# Patient Record
Sex: MALE | Race: OTHER | HISPANIC OR LATINO
[De-identification: names, ages, dates, MRNs, and addresses within clinical notes are randomized per-mention and may not be internally consistent; named-entity substitution may affect disease eponyms.]

---

## 2017-10-17 PROBLEM — Z00.00 ENCOUNTER FOR PREVENTIVE HEALTH EXAMINATION: Status: ACTIVE | Noted: 2017-10-17

## 2017-10-20 ENCOUNTER — APPOINTMENT (OUTPATIENT)
Dept: GASTROENTEROLOGY | Facility: CLINIC | Age: 52
End: 2017-10-20

## 2017-10-20 ENCOUNTER — OUTPATIENT (OUTPATIENT)
Dept: OUTPATIENT SERVICES | Facility: HOSPITAL | Age: 52
LOS: 1 days | Discharge: HOME | End: 2017-10-20

## 2017-10-20 VITALS
BODY MASS INDEX: 26.5 KG/M2 | HEIGHT: 60 IN | HEART RATE: 58 BPM | SYSTOLIC BLOOD PRESSURE: 113 MMHG | WEIGHT: 135 LBS | DIASTOLIC BLOOD PRESSURE: 66 MMHG

## 2017-10-20 DIAGNOSIS — R62.50 UNSPECIFIED LACK OF EXPECTED NORMAL PHYSIOLOGICAL DEVELOPMENT IN CHILDHOOD: ICD-10-CM

## 2017-10-20 DIAGNOSIS — E78.5 HYPERLIPIDEMIA, UNSPECIFIED: ICD-10-CM

## 2017-10-20 RX ORDER — POLYETHYLENE GLYCOL 3350 AND ELECTROLYTES WITH LEMON FLAVOR 236; 22.74; 6.74; 5.86; 2.97 G/4L; G/4L; G/4L; G/4L; G/4L
236 POWDER, FOR SOLUTION ORAL
Qty: 1 | Refills: 0 | Status: ACTIVE | COMMUNITY
Start: 2017-10-20 | End: 1900-01-01

## 2018-04-18 ENCOUNTER — RESULT REVIEW (OUTPATIENT)
Age: 53
End: 2018-04-18

## 2018-04-18 ENCOUNTER — OUTPATIENT (OUTPATIENT)
Dept: OUTPATIENT SERVICES | Facility: HOSPITAL | Age: 53
LOS: 1 days | Discharge: HOME | End: 2018-04-18

## 2018-04-18 VITALS
RESPIRATION RATE: 16 BRPM | OXYGEN SATURATION: 100 % | DIASTOLIC BLOOD PRESSURE: 64 MMHG | SYSTOLIC BLOOD PRESSURE: 105 MMHG | HEART RATE: 77 BPM

## 2018-04-18 VITALS
HEART RATE: 95 BPM | TEMPERATURE: 98 F | DIASTOLIC BLOOD PRESSURE: 74 MMHG | SYSTOLIC BLOOD PRESSURE: 120 MMHG | WEIGHT: 134.92 LBS | HEIGHT: 60 IN | RESPIRATION RATE: 20 BRPM

## 2018-04-18 RX ORDER — CHOLECALCIFEROL (VITAMIN D3) 125 MCG
1 CAPSULE ORAL
Qty: 0 | Refills: 0 | COMMUNITY

## 2018-04-18 RX ORDER — SIMVASTATIN 20 MG/1
1 TABLET, FILM COATED ORAL
Qty: 0 | Refills: 0 | COMMUNITY

## 2018-04-18 NOTE — H&P ADULT - HISTORY OF PRESENT ILLNESS
52 year old male with history of hyperlipidemia and MR here for screening colonoscopy he had clearance by cardio and medical for borderline LVH.

## 2018-04-18 NOTE — H&P ADULT - NSHPPHYSICALEXAM_GEN_ALL_CORE
GENERAL:  Appears stated age, well-groomed, well-nourished, no distress  HEENT:  NC/AT,  conjunctivae clear and pink,  CHEST:  Full & symmetric excursion, no increased effort, breath sounds clear  HEART:  Regular rhythm, S1, S2,   ABDOMEN:  Soft, non-tender, non-distended,   EXTREMITIES:  no cyanosis clubbing or edema    NEURO:  Alert, oriented, no asterixis, no tremor, no encephalopathy

## 2018-04-21 DIAGNOSIS — K64.4 RESIDUAL HEMORRHOIDAL SKIN TAGS: ICD-10-CM

## 2018-04-21 DIAGNOSIS — Z12.11 ENCOUNTER FOR SCREENING FOR MALIGNANT NEOPLASM OF COLON: ICD-10-CM

## 2018-04-21 DIAGNOSIS — E78.5 HYPERLIPIDEMIA, UNSPECIFIED: ICD-10-CM

## 2018-04-21 DIAGNOSIS — I34.0 NONRHEUMATIC MITRAL (VALVE) INSUFFICIENCY: ICD-10-CM

## 2018-05-01 LAB — SURGICAL PATHOLOGY STUDY: SIGNIFICANT CHANGE UP

## 2022-12-27 ENCOUNTER — NON-APPOINTMENT (OUTPATIENT)
Age: 57
End: 2022-12-27

## 2023-01-04 ENCOUNTER — NON-APPOINTMENT (OUTPATIENT)
Age: 58
End: 2023-01-04

## 2023-06-27 PROBLEM — E78.5 HYPERLIPIDEMIA, UNSPECIFIED: Chronic | Status: ACTIVE | Noted: 2018-04-18

## 2023-06-27 PROBLEM — R62.50 UNSPECIFIED LACK OF EXPECTED NORMAL PHYSIOLOGICAL DEVELOPMENT IN CHILDHOOD: Chronic | Status: ACTIVE | Noted: 2018-04-18

## 2023-06-27 PROBLEM — I34.0 NONRHEUMATIC MITRAL (VALVE) INSUFFICIENCY: Chronic | Status: ACTIVE | Noted: 2018-04-18

## 2023-09-20 NOTE — ASU PREOP CHECKLIST - TO WHOM
HPI    OD Hordeolum lower lash line   Pt reports it started a few months ago   Pt reports pain/ itchy/   Pt reports using warm compresses   Pt reports using gtt prescribed   Pt reports was soft now its hard   Dr in April put in a referral for Opthalmology in March   Last edited by Rachael Bullock on 9/20/2023  3:07 PM.            Assessment /Plan     For exam results, see Encounter Report.    Moll's gland cyst of right eye  -referral Oculoplastic    Hordeolum externum right lower eyelid  -     Ambulatory referral/consult to Ophthalmology  -not a hordeolum no benefits to Abx, compresses, or drops    RTC as directed                     ENDO RN

## 2023-10-10 ENCOUNTER — APPOINTMENT (OUTPATIENT)
Dept: OPHTHALMOLOGY | Facility: CLINIC | Age: 58
End: 2023-10-10

## 2023-10-25 ENCOUNTER — OUTPATIENT (OUTPATIENT)
Dept: OUTPATIENT SERVICES | Facility: HOSPITAL | Age: 58
LOS: 1 days | End: 2023-10-25
Payer: SELF-PAY

## 2023-10-25 ENCOUNTER — APPOINTMENT (OUTPATIENT)
Dept: GASTROENTEROLOGY | Facility: CLINIC | Age: 58
End: 2023-10-25
Payer: COMMERCIAL

## 2023-10-25 VITALS
SYSTOLIC BLOOD PRESSURE: 128 MMHG | HEART RATE: 71 BPM | HEIGHT: 60 IN | BODY MASS INDEX: 29.25 KG/M2 | DIASTOLIC BLOOD PRESSURE: 86 MMHG | OXYGEN SATURATION: 97 % | WEIGHT: 149 LBS

## 2023-10-25 DIAGNOSIS — Z12.11 ENCOUNTER FOR SCREENING FOR MALIGNANT NEOPLASM OF COLON: ICD-10-CM

## 2023-10-25 DIAGNOSIS — Z00.00 ENCOUNTER FOR GENERAL ADULT MEDICAL EXAMINATION WITHOUT ABNORMAL FINDINGS: ICD-10-CM

## 2023-10-25 PROCEDURE — 99213 OFFICE O/P EST LOW 20 MIN: CPT

## 2023-10-25 PROCEDURE — 99214 OFFICE O/P EST MOD 30 MIN: CPT

## 2023-10-25 RX ORDER — POLYETHYLENE GLYCOL 3350 AND ELECTROLYTES WITH LEMON FLAVOR 236; 22.74; 6.74; 5.86; 2.97 G/4L; G/4L; G/4L; G/4L; G/4L
236 POWDER, FOR SOLUTION ORAL
Qty: 1 | Refills: 1 | Status: ACTIVE | COMMUNITY
Start: 2023-10-25 | End: 1900-01-01

## 2023-11-16 NOTE — ASU PATIENT PROFILE, ADULT - PMH
Other PATIENT CALLED TO R\EQUEST AN RX FOR A HANDICAP PLAQCARD, SHE SAID ITS HARD TO WALK FOR A DISTANCE.  PLS CALL TO ADVISE [phone] Hyperlipemia    Mitral regurgitation    Moderate developmental delay

## 2024-01-09 ENCOUNTER — APPOINTMENT (OUTPATIENT)
Dept: OPHTHALMOLOGY | Facility: CLINIC | Age: 59
End: 2024-01-09

## 2024-05-01 ENCOUNTER — APPOINTMENT (OUTPATIENT)
Dept: GASTROENTEROLOGY | Facility: CLINIC | Age: 59
End: 2024-05-01

## 2024-06-18 ENCOUNTER — APPOINTMENT (OUTPATIENT)
Dept: NEUROLOGY | Facility: CLINIC | Age: 59
End: 2024-06-18

## 2024-06-25 ENCOUNTER — APPOINTMENT (OUTPATIENT)
Dept: NEUROLOGY | Facility: CLINIC | Age: 59
End: 2024-06-25

## 2024-09-18 DIAGNOSIS — Z00.00 ENCOUNTER FOR GENERAL ADULT MEDICAL EXAMINATION W/OUT ABNORMAL FINDINGS: ICD-10-CM

## 2024-10-12 ENCOUNTER — INPATIENT (INPATIENT)
Facility: HOSPITAL | Age: 59
LOS: 5 days | Discharge: HOME | DRG: 101 | End: 2024-10-18
Attending: STUDENT IN AN ORGANIZED HEALTH CARE EDUCATION/TRAINING PROGRAM | Admitting: FAMILY MEDICINE
Payer: COMMERCIAL

## 2024-10-12 VITALS
DIASTOLIC BLOOD PRESSURE: 74 MMHG | WEIGHT: 149.91 LBS | RESPIRATION RATE: 18 BRPM | HEART RATE: 99 BPM | TEMPERATURE: 99 F | SYSTOLIC BLOOD PRESSURE: 114 MMHG | HEIGHT: 60 IN | OXYGEN SATURATION: 98 %

## 2024-10-12 DIAGNOSIS — R56.9 UNSPECIFIED CONVULSIONS: ICD-10-CM

## 2024-10-12 LAB
ALBUMIN SERPL ELPH-MCNC: 4.6 G/DL — SIGNIFICANT CHANGE UP (ref 3.5–5.2)
ALP SERPL-CCNC: 106 U/L — SIGNIFICANT CHANGE UP (ref 30–115)
ALT FLD-CCNC: 44 U/L — HIGH (ref 0–41)
ANION GAP SERPL CALC-SCNC: 15 MMOL/L — HIGH (ref 7–14)
APPEARANCE UR: CLEAR — SIGNIFICANT CHANGE UP
AST SERPL-CCNC: 29 U/L — SIGNIFICANT CHANGE UP (ref 0–41)
BASOPHILS # BLD AUTO: 0.07 K/UL — SIGNIFICANT CHANGE UP (ref 0–0.2)
BASOPHILS NFR BLD AUTO: 1.1 % — HIGH (ref 0–1)
BILIRUB SERPL-MCNC: 1.6 MG/DL — HIGH (ref 0.2–1.2)
BILIRUB UR-MCNC: NEGATIVE — SIGNIFICANT CHANGE UP
BUN SERPL-MCNC: 20 MG/DL — SIGNIFICANT CHANGE UP (ref 10–20)
CALCIUM SERPL-MCNC: 10.1 MG/DL — SIGNIFICANT CHANGE UP (ref 8.4–10.5)
CHLORIDE SERPL-SCNC: 101 MMOL/L — SIGNIFICANT CHANGE UP (ref 98–110)
CO2 SERPL-SCNC: 24 MMOL/L — SIGNIFICANT CHANGE UP (ref 17–32)
COLOR SPEC: YELLOW — SIGNIFICANT CHANGE UP
CREAT SERPL-MCNC: 1.3 MG/DL — SIGNIFICANT CHANGE UP (ref 0.7–1.5)
DIFF PNL FLD: NEGATIVE — SIGNIFICANT CHANGE UP
EGFR: 64 ML/MIN/1.73M2 — SIGNIFICANT CHANGE UP
EOSINOPHIL # BLD AUTO: 0.07 K/UL — SIGNIFICANT CHANGE UP (ref 0–0.7)
EOSINOPHIL NFR BLD AUTO: 1.1 % — SIGNIFICANT CHANGE UP (ref 0–8)
GAS PNL BLDV: SIGNIFICANT CHANGE UP
GAS PNL BLDV: SIGNIFICANT CHANGE UP
GLUCOSE SERPL-MCNC: 75 MG/DL — SIGNIFICANT CHANGE UP (ref 70–99)
GLUCOSE UR QL: NEGATIVE MG/DL — SIGNIFICANT CHANGE UP
HCT VFR BLD CALC: 47.2 % — SIGNIFICANT CHANGE UP (ref 42–52)
HGB BLD-MCNC: 16.4 G/DL — SIGNIFICANT CHANGE UP (ref 14–18)
IMM GRANULOCYTES NFR BLD AUTO: 0.3 % — SIGNIFICANT CHANGE UP (ref 0.1–0.3)
KETONES UR-MCNC: NEGATIVE MG/DL — SIGNIFICANT CHANGE UP
LEUKOCYTE ESTERASE UR-ACNC: NEGATIVE — SIGNIFICANT CHANGE UP
LYMPHOCYTES # BLD AUTO: 1.62 K/UL — SIGNIFICANT CHANGE UP (ref 1.2–3.4)
LYMPHOCYTES # BLD AUTO: 25.1 % — SIGNIFICANT CHANGE UP (ref 20.5–51.1)
MAGNESIUM SERPL-MCNC: 2.1 MG/DL — SIGNIFICANT CHANGE UP (ref 1.8–2.4)
MCHC RBC-ENTMCNC: 32.8 PG — HIGH (ref 27–31)
MCHC RBC-ENTMCNC: 34.7 G/DL — SIGNIFICANT CHANGE UP (ref 32–37)
MCV RBC AUTO: 94.4 FL — HIGH (ref 80–94)
MONOCYTES # BLD AUTO: 0.73 K/UL — HIGH (ref 0.1–0.6)
MONOCYTES NFR BLD AUTO: 11.3 % — HIGH (ref 1.7–9.3)
NEUTROPHILS # BLD AUTO: 3.94 K/UL — SIGNIFICANT CHANGE UP (ref 1.4–6.5)
NEUTROPHILS NFR BLD AUTO: 61.1 % — SIGNIFICANT CHANGE UP (ref 42.2–75.2)
NITRITE UR-MCNC: NEGATIVE — SIGNIFICANT CHANGE UP
NRBC # BLD: 0 /100 WBCS — SIGNIFICANT CHANGE UP (ref 0–0)
PH UR: 7 — SIGNIFICANT CHANGE UP (ref 5–8)
PLATELET # BLD AUTO: 239 K/UL — SIGNIFICANT CHANGE UP (ref 130–400)
PMV BLD: 9 FL — SIGNIFICANT CHANGE UP (ref 7.4–10.4)
POTASSIUM SERPL-MCNC: 4.4 MMOL/L — SIGNIFICANT CHANGE UP (ref 3.5–5)
POTASSIUM SERPL-SCNC: 4.4 MMOL/L — SIGNIFICANT CHANGE UP (ref 3.5–5)
PROT SERPL-MCNC: 7.9 G/DL — SIGNIFICANT CHANGE UP (ref 6–8)
PROT UR-MCNC: NEGATIVE MG/DL — SIGNIFICANT CHANGE UP
RBC # BLD: 5 M/UL — SIGNIFICANT CHANGE UP (ref 4.7–6.1)
RBC # FLD: 13.5 % — SIGNIFICANT CHANGE UP (ref 11.5–14.5)
SODIUM SERPL-SCNC: 140 MMOL/L — SIGNIFICANT CHANGE UP (ref 135–146)
SP GR SPEC: 1.01 — SIGNIFICANT CHANGE UP (ref 1–1.03)
TROPONIN T, HIGH SENSITIVITY RESULT: 12 NG/L — SIGNIFICANT CHANGE UP (ref 6–21)
UROBILINOGEN FLD QL: 0.2 MG/DL — SIGNIFICANT CHANGE UP (ref 0.2–1)
WBC # BLD: 6.45 K/UL — SIGNIFICANT CHANGE UP (ref 4.8–10.8)
WBC # FLD AUTO: 6.45 K/UL — SIGNIFICANT CHANGE UP (ref 4.8–10.8)

## 2024-10-12 PROCEDURE — 95716 VEEG EA 12-26HR CONT MNTR: CPT

## 2024-10-12 PROCEDURE — 99222 1ST HOSP IP/OBS MODERATE 55: CPT

## 2024-10-12 PROCEDURE — 95714 VEEG EA 12-26 HR UNMNTR: CPT

## 2024-10-12 PROCEDURE — 95700 EEG CONT REC W/VID EEG TECH: CPT

## 2024-10-12 PROCEDURE — 99285 EMERGENCY DEPT VISIT HI MDM: CPT

## 2024-10-12 PROCEDURE — 97162 PT EVAL MOD COMPLEX 30 MIN: CPT | Mod: GP

## 2024-10-12 PROCEDURE — 93306 TTE W/DOPPLER COMPLETE: CPT

## 2024-10-12 PROCEDURE — 97165 OT EVAL LOW COMPLEX 30 MIN: CPT | Mod: GO

## 2024-10-12 PROCEDURE — 70450 CT HEAD/BRAIN W/O DYE: CPT | Mod: 26,MC

## 2024-10-12 PROCEDURE — 97110 THERAPEUTIC EXERCISES: CPT | Mod: GP

## 2024-10-12 PROCEDURE — 97116 GAIT TRAINING THERAPY: CPT | Mod: GP

## 2024-10-12 PROCEDURE — 71045 X-RAY EXAM CHEST 1 VIEW: CPT | Mod: 26

## 2024-10-12 RX ORDER — SODIUM CHLORIDE IRRIG SOLUTION 0.9 %
1000 SOLUTION, IRRIGATION IRRIGATION ONCE
Refills: 0 | Status: COMPLETED | OUTPATIENT
Start: 2024-10-12 | End: 2024-10-12

## 2024-10-12 RX ORDER — ONDANSETRON HCL/PF 4 MG/2 ML
4 VIAL (ML) INJECTION EVERY 6 HOURS
Refills: 0 | Status: DISCONTINUED | OUTPATIENT
Start: 2024-10-12 | End: 2024-10-18

## 2024-10-12 RX ORDER — ASPIRIN 325 MG
81 TABLET ORAL DAILY
Refills: 0 | Status: DISCONTINUED | OUTPATIENT
Start: 2024-10-12 | End: 2024-10-18

## 2024-10-12 RX ORDER — ASPIRIN 325 MG
1 TABLET ORAL
Refills: 0 | DISCHARGE

## 2024-10-12 RX ORDER — PANTOPRAZOLE SODIUM 40 MG/1
40 TABLET, DELAYED RELEASE ORAL
Refills: 0 | Status: DISCONTINUED | OUTPATIENT
Start: 2024-10-12 | End: 2024-10-18

## 2024-10-12 RX ORDER — ACETAMINOPHEN 325 MG
650 TABLET ORAL EVERY 6 HOURS
Refills: 0 | Status: DISCONTINUED | OUTPATIENT
Start: 2024-10-12 | End: 2024-10-18

## 2024-10-12 RX ORDER — ATORVASTATIN CALCIUM 10 MG/1
10 TABLET, FILM COATED ORAL AT BEDTIME
Refills: 0 | Status: DISCONTINUED | OUTPATIENT
Start: 2024-10-12 | End: 2024-10-18

## 2024-10-12 RX ORDER — SENNOSIDES 8.6 MG
2 TABLET ORAL AT BEDTIME
Refills: 0 | Status: DISCONTINUED | OUTPATIENT
Start: 2024-10-12 | End: 2024-10-18

## 2024-10-12 RX ADMIN — ATORVASTATIN CALCIUM 10 MILLIGRAM(S): 10 TABLET, FILM COATED ORAL at 22:59

## 2024-10-12 RX ADMIN — Medication 2000 MILLILITER(S): at 12:03

## 2024-10-12 RX ADMIN — Medication 1000 MILLILITER(S): at 15:31

## 2024-10-12 NOTE — ED ADULT TRIAGE NOTE - CHIEF COMPLAINT QUOTE
Brought via EMS from  for syncope episode followed by a seizure like activity.  As per caregiver " pt usually alert and verbal at baseline and no known hx of seizures".  Caregiver also states " pt fell out of bed 2 weeks ago and hit head" . Pt AAox1 in triage.  - HI, -AC, +LOC Hx: dementia,   FAST could not be performed in triage due to  pt not following commands

## 2024-10-12 NOTE — H&P ADULT - NSHPPHYSICALEXAM_GEN_ALL_CORE
Vital Signs Last 24 Hrs  T(C): 36.4 (12 Oct 2024 15:43), Max: 37.2 (12 Oct 2024 11:24)  T(F): 97.6 (12 Oct 2024 15:43), Max: 99 (12 Oct 2024 11:24)  HR: 72 (12 Oct 2024 15:43) (72 - 99)  BP: 130/86 (12 Oct 2024 15:43) (114/74 - 130/86)  BP(mean): --  RR: 18 (12 Oct 2024 15:43) (18 - 18)  SpO2: 100% (12 Oct 2024 15:43) (98% - 100%)    Parameters below as of 12 Oct 2024 15:43  Patient On (Oxygen Delivery Method): room air      PHYSICAL EXAM-  GENERAL: NAD   HEAD:  Atraumatic, Normocephalic  EYES: EOMI, PERRLA, conjunctiva and sclera clear  NECK: Supple, No JVD, Normal thyroid  NERVOUS SYSTEM:  Alert & Oriented X1, Motor Strength 5/5 B/L upper and lower extremities; DTRs 2+ intact and symmetric  CHEST/LUNG: Clear to percussion bilaterally; No rales, rhonchi, wheezing, or rubs  HEART: Regular rate and rhythm; No murmurs, rubs, or gallops  ABDOMEN: Soft, Nontender, Nondistended; Bowel sounds present  EXTREMITIES:   No clubbing, cyanosis, or edema  SKIN: No rashes or lesions

## 2024-10-12 NOTE — H&P ADULT - NSICDXPASTMEDICALHX_GEN_ALL_CORE_FT
PAST MEDICAL HISTORY:  Dementia     Hyperlipemia     Mitral regurgitation     Moderate developmental delay

## 2024-10-12 NOTE — ED PROVIDER NOTE - DIFFERENTIAL DIAGNOSIS
Differential Diagnosis The differential diagnosis for patients clinical presentation includes but is not limited to:  convulsive syncope, seizure, electrolyte abnormality

## 2024-10-12 NOTE — ED PROVIDER NOTE - TEST CONSIDERED BUT NOT PERFORMED
Tests Considered But Not Performed LP  This test was considered in the ED for this patient, however, it is below testing threshold when considering clinical picture, risk, and availability.

## 2024-10-12 NOTE — ED ADULT NURSE NOTE - OBJECTIVE STATEMENT
pt is a 60 yo M with a pmhx of developmental delay, Dementia, mitral valve regurgitation, and HLD presents to the ED after a possible syncopal episode followed by seizure like activity while at the [address]. According to staff the patient fell approximately 2 weeks ago and struck head. The seizure like activity lasted approximately 5-10 seconds.

## 2024-10-12 NOTE — ED PROVIDER NOTE - CLINICAL SUMMARY MEDICAL DECISION MAKING FREE TEXT BOX
59-year-old male past medical history intellectual disability, dyslipidemia presents to the emergency department status post suspected seizure from group home today.  Additional history obtained from group home aide.  Patient was a adult  today when he felt like he was going to pass out and was caught by staff members and they lowered him to the floor.  After he was lowered to the floor staff reports he had a seizure lasting 5 to 6 minutes.  Seizure appeared to be generalized tonic-clonic and patient was confused after this.  EMS was called and brought patient in and patient was postictal with EMS.  Patient has no history of prior seizures.  No fever, chills, recent illness.  Only thing out of the ordinary was patient fell 2 weeks ago and possibly hit his head.  Upon arrival to the emergency department, vital signs reviewed.  Patient is awake, alert, appropriate.  Gross neurological exam is unremarkable.  IV placed and labs sent and appropriate labs and head CT performed.  Patient no further seizure episodes.  VBG performed and initially shows elevated lactate level.  This was repeated and is improving.  Neurology was consulted for care.  Neurology recommends admission to epilepsy unit and will admit to [facility] in the epilepsy unit.  Dr. Conklin made aware for admission at [facility] and Dr. Solomon called at that [place] to do admission.    ED work up reviewed and results and plan of care discussed with patient. Patient requires admission for further work up, monitoring, and management. Need for admission discussed with patient.

## 2024-10-12 NOTE — H&P ADULT - ASSESSMENT
Patient is 60 yo male with hx of intellectual disability, HLD, and dementia presenting with       #Seizure like activity  -Head CT scan shows no acute process  -neurology recommended transfer to seizure unit for EEG and syncopal workup  -Seizure precautions  -sitter consult since patient likes to wonder  -Ativan PRN  -neurology to follow up   -Orthostatic BP, and echo  -UA negative.  follow up Urine culture     #HLD/Dementia  -Continue with home medications    #Progress Note Handoff  Pending (specify):  home   Family discussion:  plan of care was discussed with patient and family in details.  all questions were answered.  seems to understand, and in agreement  Disposition: PT  discussed case with AID, and issac PRICE ([phone])   Patient is 58 yo male with hx of intellectual disability, HLD, and dementia presenting with       #Seizure like activity  -Head CT scan shows no acute process  -neurology recommended transfer to seizure unit for EEG and syncopal workup  -Seizure precautions  -sitter consult since patient likes to wonder  -Ativan PRN  -neurology to follow up   -Orthostatic BP, and echo  -UA negative.  follow up Urine culture     #HLD/Dementia  -Continue with home medications    #Progress Note Handoff  Pending (specify):  home   Family discussion:  plan of care was discussed with patient and family in details.  all questions were answered.  seems to understand, and in agreement  Disposition: PT  discussed case with AID (where he resides ([facility] [phone], [facility] [phone]), and issac PRICE ([phone])

## 2024-10-12 NOTE — CONSULT NOTE ADULT - ATTENDING COMMENTS
Patient seen and examined and agree with above except as noted.  Patients history, notes, labs, imaging, vitals and meds reviewed personally.  Episode of shaking while out  Currently at baseline    Plan as above

## 2024-10-12 NOTE — H&P ADULT - HISTORY OF PRESENT ILLNESS
Patient is 59-year-old male PMH HLD and intellectual disability presents to the ED for evaluation of altered mental status.  Patient was at [facility] today when he had a syncopal episode.  Patient was caught by staff members and lowered to the floor.  Patient did not fall, no head trauma.  Staff reports patient subsequently had seizure-like activity lasting about 5 to 6 minutes.  On EMS arrival, patient confused.  Patient's aide at bedside denies history of seizure.  Denies any recent medication changes or infection. Aide reports patient had a fall 2 weeks ago, was evaluated at urgent care and sent home. On ED arrival, patient back to neurologic baseline.

## 2024-10-12 NOTE — ED ADULT NURSE NOTE - NSFALLHARMRISKINTERV_ED_ALL_ED

## 2024-10-12 NOTE — ED PROVIDER NOTE - ATTENDING CONTRIBUTION TO CARE
I personally evaluated patient. I agree with the findings and plan with all documentation on chart except as documented  in my note.    59-year-old male past medical history intellectual disability, dyslipidemia presents to the emergency department status post suspected seizure from group home today.  Additional history obtained from group home aide.  Patient was a adult  today when he felt like he was going to pass out and was caught by staff members and they lowered him to the floor.  After he was lowered to the floor staff reports he had a seizure lasting 5 to 6 minutes.  Seizure appeared to be generalized tonic-clonic and patient was confused after this.  EMS was called and brought patient in and patient was postictal with EMS.  Patient has no history of prior seizures.  No fever, chills, recent illness.  Only thing out of the ordinary was patient fell 2 weeks ago and possibly hit his head.  Upon arrival to the emergency department, vital signs reviewed.  Patient is awake, alert, appropriate.  Gross neurological exam is unremarkable.  IV placed and labs sent and appropriate labs and head CT performed.  Patient no further seizure episodes.  VBG performed and initially shows elevated lactate level.  This was repeated and is improving.  Neurology was consulted for care.  Neurology recommends admission to epilepsy unit and will admit to [facility] in the epilepsy unit.  Dr. Conklin made aware for admission at [facility] and Dr. Solomon called at that [facility] to do admission.    ED work up reviewed and results and plan of care discussed with patient. Patient requires admission for further work up, monitoring, and management. Need for admission discussed with patient.

## 2024-10-12 NOTE — ED PROVIDER NOTE - OBJECTIVE STATEMENT
59-year-old male PMH HLD and intellectual disability presents to the ED for evaluation of altered mental status.  Patient was at [facility] today when he had a syncopal episode.  Patient was caught by staff members and lowered to the floor.  Patient did not fall, no head trauma.  Staff reports patient subsequently had seizure-like activity lasting about 5 to 6 minutes.  On EMS arrival, patient confused.  Patient's aide at bedside denies history of seizure.  Denies any recent medication changes or infection. Aide reports patient had a fall 2 weeks ago, was evaluated at urgent care and sent home. On ED arrival, patient back to neurologic baseline.

## 2024-10-12 NOTE — ED PROVIDER NOTE - PROGRESS NOTE DETAILS
Neurology consulted and patient evaluated.  Recommend syncope workup and if medically care admit to [facility] for video EEG.

## 2024-10-12 NOTE — ED PROVIDER NOTE - PHYSICAL EXAMINATION
VITAL SIGNS: I have reviewed nursing notes and confirm.  CONSTITUTIONAL: well-appearing, non-toxic, NAD  SKIN: Warm dry  HEAD: NCAT  EYES: EOMI, PERRL  ENT: Moist mucous membranes, normal pharynx with no erythema or exudates  NECK: Supple  CARD: RRR, no murmurs, rubs or gallops  RESP: clear to ausculation b/l.  No rales, rhonchi, or wheezing.  ABD: soft, non-tender, non-distended.  EXT: Full ROM, no bony tenderness, no pedal edema, no calf tenderness  NEURO: normal motor. normal sensory. CN II-XII intact. Awake and alert.  PSYCH: Cooperative, appropriate. full weight-bearing

## 2024-10-13 PROCEDURE — 99222 1ST HOSP IP/OBS MODERATE 55: CPT

## 2024-10-13 PROCEDURE — 99232 SBSQ HOSP IP/OBS MODERATE 35: CPT

## 2024-10-13 PROCEDURE — 95718 EEG PHYS/QHP 2-12 HR W/VEEG: CPT

## 2024-10-13 RX ADMIN — ATORVASTATIN CALCIUM 10 MILLIGRAM(S): 10 TABLET, FILM COATED ORAL at 21:45

## 2024-10-13 RX ADMIN — Medication 5000 UNIT(S): at 17:53

## 2024-10-13 RX ADMIN — Medication 5000 UNIT(S): at 05:40

## 2024-10-13 RX ADMIN — Medication 81 MILLIGRAM(S): at 12:54

## 2024-10-13 RX ADMIN — PANTOPRAZOLE SODIUM 40 MILLIGRAM(S): 40 TABLET, DELAYED RELEASE ORAL at 05:40

## 2024-10-13 NOTE — PROGRESS NOTE ADULT - SUBJECTIVE AND OBJECTIVE BOX
Hospital Day:  1d    Subjective:    Patient is a 59y old  Male who presents with a chief complaint of seizure (12 Oct 2024 21:10)    This morning patient is lying in bed comfortably. No events overnight.      Past Medical Hx:   Hyperlipemia    Mitral regurgitation    Moderate developmental delay    Dementia      Past Sx:  No significant past surgical history      Allergies:  No Known Allergies    Current Meds:   Standng Meds:  aspirin enteric coated 81 milliGRAM(s) Oral daily  atorvastatin 10 milliGRAM(s) Oral at bedtime  heparin   Injectable 5000 Unit(s) SubCutaneous every 12 hours  pantoprazole    Tablet 40 milliGRAM(s) Oral before breakfast    PRN Meds:  acetaminophen     Tablet .. 650 milliGRAM(s) Oral every 6 hours PRN Temp greater or equal to 38C (100.4F), Mild Pain (1 - 3)  LORazepam   Injectable 1 milliGRAM(s) IV Push every 4 hours PRN seizure  ondansetron Injectable 4 milliGRAM(s) IV Push every 6 hours PRN Nausea  senna 2 Tablet(s) Oral at bedtime PRN Constipation    HOME MEDICATIONS:  aspirin 81 mg oral delayed release tablet: 1 tab(s) orally once a day  simvastatin 20 mg oral tablet: 1 tab(s) orally once a day (at bedtime)  Vitamin D3 1000 intl units oral tablet: 1 tab(s) orally once a day      Vital Signs:   T(F): 96.7 (10-13-24 @ 05:17), Max: 97.6 (10-12-24 @ 15:43)  HR: 68 (10-13-24 @ 05:17) (68 - 72)  BP: 113/70 (10-13-24 @ 05:17) (113/70 - 130/86)  RR: 16 (10-13-24 @ 05:17) (16 - 18)  SpO2: 92% (10-13-24 @ 05:17) (92% - 100%)        Physical Exam:   GENERAL: NAD  HEENT: NCAT  CHEST/LUNG: CTAB  HEART: Regular rate and rhythm; s1 s2 appreciated, No murmurs, rubs, or gallops  ABDOMEN: Soft, Nontender, Nondistended; Bowel sounds present  EXTREMITIES: No LE edema b/l  SKIN: no rashes, no new lesions  NERVOUS SYSTEM:  Alert & Oriented X3  LINES/CATHETERS:        Labs:                         16.4   6.45  )-----------( 239      ( 12 Oct 2024 12:00 )             47.2       12 Oct 2024 12:00    140    |  101    |  20     ----------------------------<  75     4.4     |  24     |  1.3      Ca    10.1       12 Oct 2024 12:00  Mg     2.1       12 Oct 2024 12:00    TPro  7.9    /  Alb  4.6    /  TBili  1.6    /  DBili  x      /  AST  29     /  ALT  44     /  AlkPhos  106    12 Oct 2024 12:00                    Urinalysis Basic - ( 12 Oct 2024 16:34 )    Color: Yellow / Appearance: Clear / S.009 / pH: x  Gluc: x / Ketone: Negative mg/dL  / Bili: Negative / Urobili: 0.2 mg/dL   Blood: x / Protein: Negative mg/dL / Nitrite: Negative   Leuk Esterase: Negative / RBC: x / WBC x   Sq Epi: x / Non Sq Epi: x / Bacteria: x                Assessment and Plan:

## 2024-10-13 NOTE — CHART NOTE - NSCHARTNOTEFT_GEN_A_CORE
Attempted to get consent for vEEG, [phone], no answer, will attempt again later. Attempted to get consent for vEEG, advisory board [phone], no answer, will attempt again later.

## 2024-10-13 NOTE — EEG REPORT - NS EEG TEXT BOX
[facility]   COMPREHENSIVE EPILEPSY CENTER   REPORT OF CONTINUOUS VIDEO EEG     [facility]: [address], [place] [address], Ph#: [phone]  [facility]: [address], [place] [address], Ph#: [phone]  Office: [address] [place] [address], [facility] [address], [place] [address] Ph#: [phone]    Patient Name: PAM PIERRE  Age and : 59y (67)  MRN #: [id number]  Location: [address]  Referring Physician: Merrill High    Study Date: 10-12-24 - 10-13-24  Duration: 7 hr 44 min  _____________________________________________________________  STUDY INFORMATION    EEG Recording Technique:  The patient underwent continuous Video-EEG monitoring, using Telemetry System hardware on the XLTek Digital System. EEG and video data were stored on a computer hard drive with important events saved in digital archive files. The material was reviewed by a physician (electroencephalographer / epileptologist) on a daily basis. Reddy and seizure detection algorithms were utilized and reviewed. An EEG Technician attended to the patient, and was available throughout daytime work hours.  The epilepsy center neurologist was available in person or on call 24-hours per day.    EEG Placement and Labeling of Electrodes:  The EEG was performed utilizing 20 channel referential EEG connections (coronal over temporal over parasagittal montage) using all standard 10-20 electrode placements with EKG, with additional electrodes placed in the inferior temporal region using the modified 10-10 montage electrode placements for elective admissions, or if deemed necessary. Recording was at a sampling rate of 256 samples per second per channel. Time synchronized digital video recording was done simultaneously with EEG recording. A low light infrared camera was used for low light recording.     _____________________________________________________________  HISTORY    Patient is a 59y old  Male who presents with a chief complaint of seizure (12 Oct 2024 21:10)      PERTINENT MEDICATION:  MEDICATIONS  (STANDING):  aspirin enteric coated 81 milliGRAM(s) Oral daily  atorvastatin 10 milliGRAM(s) Oral at bedtime  heparin   Injectable 5000 Unit(s) SubCutaneous every 12 hours  pantoprazole    Tablet 40 milliGRAM(s) Oral before breakfast    _____________________________________________________________  INTERPRETATION    Findings: The background was continuous, spontaneously variable and reactive. During wakefulness, the posterior dominant rhythm was not well modulated.    Background Slowing:  -Continuous diffuse theta and polymorphic delta slowing.    Focal Slowing:   None was present.    Sleep Background:  Stage II sleep transients were not recorded.  Drowsiness and stage II sleep transients were not recorded.    Other Non-Epileptiform Findings:  None were present.    Interictal Epileptiform Activity:   None were present.    Events:  Clinical events: None recorded.  Seizures: None recorded.    Artifacts:  Intermittent myogenic and movement artifacts were noted.    ECG:  The heart rate on single channel ECG was predominantly between 60-80 BPM.    _____________________________________________________________  EEG SUMMARY/CLASSIFICATION     Abnormal EEG in the awake states.  -Continuous slowing, generalized, moderate    _____________________________________________________________  EEG IMPRESSION/CLINICAL CORRELATE    Abnormal EEG study.  1. Moderate nonspecific diffuse or multifocal cerebral dysfunction.   2. No epileptiform pattern or seizure seen.    Jairo Garcia MD  Neurology Attending Physician

## 2024-10-14 ENCOUNTER — TRANSCRIPTION ENCOUNTER (OUTPATIENT)
Age: 59
End: 2024-10-14

## 2024-10-14 ENCOUNTER — RESULT REVIEW (OUTPATIENT)
Age: 59
End: 2024-10-14

## 2024-10-14 PROCEDURE — 99232 SBSQ HOSP IP/OBS MODERATE 35: CPT

## 2024-10-14 PROCEDURE — 93306 TTE W/DOPPLER COMPLETE: CPT | Mod: 26

## 2024-10-14 PROCEDURE — 99231 SBSQ HOSP IP/OBS SF/LOW 25: CPT

## 2024-10-14 PROCEDURE — 95720 EEG PHY/QHP EA INCR W/VEEG: CPT

## 2024-10-14 RX ADMIN — Medication 5000 UNIT(S): at 17:47

## 2024-10-14 RX ADMIN — PANTOPRAZOLE SODIUM 40 MILLIGRAM(S): 40 TABLET, DELAYED RELEASE ORAL at 06:24

## 2024-10-14 RX ADMIN — ATORVASTATIN CALCIUM 10 MILLIGRAM(S): 10 TABLET, FILM COATED ORAL at 21:47

## 2024-10-14 RX ADMIN — Medication 5000 UNIT(S): at 06:24

## 2024-10-14 RX ADMIN — Medication 81 MILLIGRAM(S): at 12:23

## 2024-10-14 NOTE — PHYSICAL THERAPY INITIAL EVALUATION ADULT - PERTINENT HX OF CURRENT PROBLEM, REHAB EVAL
59-year-old male PMH HLD and intellectual disability presents to the ED for evaluation of altered mental status.  Patient was at [facility] today when he had a syncopal episode.  Patient was caught by staff members and lowered to the floor.  Patient did not fall, no head trauma.  Staff reports patient subsequently had seizure-like activity lasting about 5 to 6 minutes.  On EMS arrival, patient confused.  Patient's aide at bedside denies history of seizure.  Denies any recent medication changes or infection. Aide reports patient had a fall 2 weeks ago, was evaluated at urgent care and sent home. On ED arrival, patient back to neurologic baseline.

## 2024-10-14 NOTE — DISCHARGE NOTE PROVIDER - NSDCCPCAREPLAN_GEN_ALL_CORE_FT
PRINCIPAL DISCHARGE DIAGNOSIS  Diagnosis: Seizure  Assessment and Plan of Treatment: seizure was not found to be positive. complete neuro work up was completed and unremarkable. can follow up outpatient with Neurologist

## 2024-10-14 NOTE — OCCUPATIONAL THERAPY INITIAL EVALUATION ADULT - ADDITIONAL COMMENTS
PLOF obtained from DEE Delcid via telephone. Farhana stated that within past year, pt has been declining with ADLs.

## 2024-10-14 NOTE — DISCHARGE NOTE PROVIDER - NSDCFUSCHEDAPPT_GEN_ALL_CORE_FT
Stas Finley  [facility] PreAdmits  Scheduled Appointment: 11/12/2024    [facility] Physician [facility]  NEUROLOGY  Em[place]   Scheduled Appointment: 11/12/2024

## 2024-10-14 NOTE — PROGRESS NOTE ADULT - SUBJECTIVE AND OBJECTIVE BOX
[id number]  PAM PIERRE  59y    Male    Allergies: No Known Allergies      Medications: acetaminophen     Tablet .. 650 milliGRAM(s) Oral every 6 hours PRN  aspirin enteric coated 81 milliGRAM(s) Oral daily  atorvastatin 10 milliGRAM(s) Oral at bedtime  heparin   Injectable 5000 Unit(s) SubCutaneous every 12 hours  LORazepam   Injectable 1 milliGRAM(s) IV Push every 4 hours PRN  ondansetron Injectable 4 milliGRAM(s) IV Push every 6 hours PRN  pantoprazole    Tablet 40 milliGRAM(s) Oral before breakfast  senna 2 Tablet(s) Oral at bedtime PRN      T(C): 37.1 (10-14-24 @ 05:00), Max: 37.2 (10-13-24 @ 21:09)  HR: 70 (10-14-24 @ 05:00) (70 - 70)  BP: 120/70 (10-14-24 @ 05:00) (120/70 - 120/70)  RR: 16 (10-14-24 @ 05:00) (16 - 16)  SpO2: 97% (10-14-24 @ 05:00) (97% - 97%)    No events overnight  VEEG continues to show nonspecific slowing. Full report in chart    PHYSICAL EXAM:    Awakens easily, in NAD. Minimally verbal    Neurological: CN II-XII in tact. No nystagmus. Full strength throughout

## 2024-10-14 NOTE — PHYSICAL THERAPY INITIAL EVALUATION ADULT - GENERAL OBSERVATIONS, REHAB EVAL
13:05 - 13:30 Chart reviewed. Order received.  Patient is ok to be  seen for PT,confirmed with RN. pt encountered  Semi marion in bed  denies pain, and agrees to participate in session, +  Heplock , + Condom cath ,NAD.

## 2024-10-14 NOTE — PHYSICAL THERAPY INITIAL EVALUATION ADULT - ADDITIONAL COMMENTS
As per RN  Jomar at Bedside  pt lives with Foster parents With family care program, has 5 steps to enter With one HR and all in one level , as per RN Pt was independent W/ all functional activity PTA W/o AD and needs Help W/ ADLS

## 2024-10-14 NOTE — DISCHARGE NOTE PROVIDER - ATTENDING DISCHARGE PHYSICAL EXAMINATION:
GENERAL: elder-middle age M  PSYCH: no agitation, baseline mentation  NERVOUS SYSTEM:  Alert & Oriented Xto self  PULMONARY: symmetrical chest rise, no accessory muscle use  EXTREMITIES:  2+ Peripheral Pulses, No clubbing, cyanosis, or edema  SKIN: No rashes or lesions

## 2024-10-14 NOTE — PROGRESS NOTE ADULT - SUBJECTIVE AND OBJECTIVE BOX
SUBJECTIVE:    Patient is a 59y old Male who presents with a chief complaint of seizure (14 Oct 2024 11:46)    Currently admitted to medicine with the primary diagnosis of Seizure       Today is hospital day 2d. This morning he is resting comfortably in bed           PAST MEDICAL & SURGICAL HISTORY  Hyperlipemia    Mitral regurgitation    Moderate developmental delay    Dementia    No significant past surgical history      SOCIAL HISTORY:    ALLERGIES:  No Known Allergies    MEDICATIONS:  STANDING MEDICATIONS  aspirin enteric coated 81 milliGRAM(s) Oral daily  atorvastatin 10 milliGRAM(s) Oral at bedtime  heparin   Injectable 5000 Unit(s) SubCutaneous every 12 hours  pantoprazole    Tablet 40 milliGRAM(s) Oral before breakfast    PRN MEDICATIONS  acetaminophen     Tablet .. 650 milliGRAM(s) Oral every 6 hours PRN  LORazepam   Injectable 2 milliGRAM(s) IV Push three times a day PRN  ondansetron Injectable 4 milliGRAM(s) IV Push every 6 hours PRN  senna 2 Tablet(s) Oral at bedtime PRN    VITALS:   T(F): 98.3  HR: 70  BP: 106/71  RR: 18  SpO2: 97%    LABS:  Negative for smoking/alcohol/drug use.             Urinalysis Basic - ( 12 Oct 2024 16:34 )    Color: Yellow / Appearance: Clear / S.009 / pH: x  Gluc: x / Ketone: Negative mg/dL  / Bili: Negative / Urobili: 0.2 mg/dL   Blood: x / Protein: Negative mg/dL / Nitrite: Negative   Leuk Esterase: Negative / RBC: x / WBC x   Sq Epi: x / Non Sq Epi: x / Bacteria: x            Urinalysis with Rflx Culture (collected 12 Oct 2024 16:34)          RADIOLOGY:    PHYSICAL EXAM:  GEN: No acute distress  HEENT: normocephalic, atraumatic, aniceteric  LUNGS: bl breath sounds   HEART: S1/S2 present. RRR, no murmurs  ABD: Soft, non-tender, non-distended. Bowel sounds present  EXT: warm   NEURO: awake       ASSESSMENT AND PLAN:    60 yo male with hx of intellectual disability, HLD, and dementia      # Syncopal episode  #H/O Intellectual disability   - trop neg, ekg   - CTH neg , VEEG negative  - U/A negative , CXR negative   - Orthostatic BP negative  - TTE negative   - Cardio eval for abnormal ekg       # H/O HLD - cw statin     dvt/ gi ppx/diet  dispo: dc ready - pending disposition back to ?? group ? home     AID (where he resides ([facility] [phone], connie [phone]), and [facility] ([phone])   SUBJECTIVE:    Patient is a 59y old Male who presents with a chief complaint of seizure (14 Oct 2024 11:46)    Currently admitted to medicine with the primary diagnosis of Seizure       Today is hospital day 2d. This morning he is resting comfortably in bed           PAST MEDICAL & SURGICAL HISTORY  Hyperlipemia    Mitral regurgitation    Moderate developmental delay    Dementia    No significant past surgical history      SOCIAL HISTORY:    ALLERGIES:  No Known Allergies    MEDICATIONS:  STANDING MEDICATIONS  aspirin enteric coated 81 milliGRAM(s) Oral daily  atorvastatin 10 milliGRAM(s) Oral at bedtime  heparin   Injectable 5000 Unit(s) SubCutaneous every 12 hours  pantoprazole    Tablet 40 milliGRAM(s) Oral before breakfast    PRN MEDICATIONS  acetaminophen     Tablet .. 650 milliGRAM(s) Oral every 6 hours PRN  LORazepam   Injectable 2 milliGRAM(s) IV Push three times a day PRN  ondansetron Injectable 4 milliGRAM(s) IV Push every 6 hours PRN  senna 2 Tablet(s) Oral at bedtime PRN    VITALS:   T(F): 98.3  HR: 70  BP: 106/71  RR: 18  SpO2: 97%    LABS:  Negative for smoking/alcohol/drug use.             Urinalysis Basic - ( 12 Oct 2024 16:34 )    Color: Yellow / Appearance: Clear / S.009 / pH: x  Gluc: x / Ketone: Negative mg/dL  / Bili: Negative / Urobili: 0.2 mg/dL   Blood: x / Protein: Negative mg/dL / Nitrite: Negative   Leuk Esterase: Negative / RBC: x / WBC x   Sq Epi: x / Non Sq Epi: x / Bacteria: x            Urinalysis with Rflx Culture (collected 12 Oct 2024 16:34)          RADIOLOGY:    PHYSICAL EXAM:  GEN: No acute distress  HEENT: normocephalic, atraumatic, aniceteric  LUNGS: bl breath sounds   HEART: S1/S2 present. RRR, no murmurs  ABD: Soft, non-tender, non-distended. Bowel sounds present  EXT: warm   NEURO: awake       ASSESSMENT AND PLAN:    60 yo male with hx of intellectual disability, HLD, and dementia      # Syncopal episode  #H/O Intellectual disability   - trop neg, ekg   - CTH neg , VEEG negative  - U/A negative , CXR negative   - Orthostatic BP negative  - TTE negative   - Cardio eval for abnormal ekg       # H/O HLD - cw statin     dvt/ gi ppx/diet  dispo: from ?? group ? home  -  needs PT EVAL  - possible 24 hrs   handoff: cardio eval     AID (where he resides ([facility] [phone], connie [phone]), and [facility] ([phone])
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
## 2024-10-14 NOTE — OCCUPATIONAL THERAPY INITIAL EVALUATION ADULT - LIVES WITH, PROFILE
Foster [facility] with [facility] +5 steps to enter with 1 handrail then level inside +walk in shower +standard toilet

## 2024-10-14 NOTE — OCCUPATIONAL THERAPY INITIAL EVALUATION ADULT - GENERAL OBSERVATIONS, REHAB EVAL
14:52-15:15; chart reviewed, ok to treat by Occupational Therapist as confirmed by RN Aman, Pt received seated in ASU recliner +(R) heplock +condom cath +1:1 in NAD. Pt in agreement with OT IE

## 2024-10-14 NOTE — DISCHARGE NOTE PROVIDER - NSDCMRMEDTOKEN_GEN_ALL_CORE_FT
aspirin 81 mg oral delayed release tablet: 1 tab(s) orally once a day  simvastatin 20 mg oral tablet: 1 tab(s) orally once a day (at bedtime)  Vitamin D3 1000 intl units oral tablet: 1 tab(s) orally once a day   aspirin 81 mg oral delayed release tablet: 1 tab(s) orally once a day  senna leaf extract oral tablet: 2 tab(s) orally once a day (at bedtime) As needed Constipation  simvastatin 20 mg oral tablet: 1 tab(s) orally once a day (at bedtime)  Vitamin D3 1000 intl units oral tablet: 1 tab(s) orally once a day

## 2024-10-14 NOTE — EEG REPORT - NS EEG TEXT BOX
[facility] Department of Neurology  Epilepsy Monitoring Unit video-EEG Report      Patient Name:	PMA PIERRE    :	1965  MRN:	-  Study Date/Time:	2024-10-13, 10:24:27 AM  Referred by:	-    Brief Clinical History:  PAM PIERRE is a 59 year old Male; study performed to investigate for seizures or markers of epilepsy.   Diagnosis Code:  R55 syncope and collapse    Patient Medication:  No Data.    Acquisition Details:  Electroencephalography was acquired using a minimum of 21 channels on an [employer] system v 9.3.1 with electrode placement according to the standard International 10-20 system following ACNS (American Clinical Neurophysiology Society) guidelines for Long-Term Video EEG monitoring.  Anterior temporal T1 and T2 electrodes were utilized whenever possible.   The [employer] automated spike & seizure detections were all reviewed in detail, in addition to extensive portions of raw EEG.  The live video was monitored continuously by trained technicians to identify events and specialty nurses trained in seizure management supervised the care of the patient in the epilepsy unit.    Day2: 2024-10-13 @ 10:24:27 AM to next morning @ 07:00 am  Background:  continuous.   Symmetry:  No persistent asymmetries of voltage or frequency.  Posterior Dominant Rhythm:  <7 Hz Symmetric  Organization:  Rudimentary  Voltage:  Normal (20uV)  Variability:  Yes	Reactivity:  Yes  Sleep:  No clearly formed sleep architecture.  Focal abnormalities:  No persistent asymmetries of voltage or frequency.  Interictal Activity:  None  Focal Slowing:  Bilateral frontal  Generalized Slowing:  Moderate to Severe  Events: No electrographic seizures or significant clinical events.  Provocations: Hyperventilation and Photic stimulation:  was not performed.  Daily Impression:  Abnormal due to Moderate generalized and bilateral frontal slowing consistent with underlying dysfunction.  No epileptiform activity and no significant clinical events occurred.      Nunu Balbuena MD  Attending Neurologist, Division of Epilepsy

## 2024-10-14 NOTE — OCCUPATIONAL THERAPY INITIAL EVALUATION ADULT - IMPAIRED TRANSFERS: TOILET, REHAB EVAL
decreased endurance/impaired balance/cognition/impaired coordination/impaired postural control/decreased strength

## 2024-10-14 NOTE — OCCUPATIONAL THERAPY INITIAL EVALUATION ADULT - PERTINENT HX OF CURRENT PROBLEM, REHAB EVAL
Patient is 59-year-old male PMH HLD and intellectual disability presents to the ED for evaluation of altered mental status.  Patient was at adult [facility] today when he had a syncopal episode.  Patient was caught by staff members and lowered to the floor.  Patient did not fall, no head trauma.  Staff reports patient subsequently had seizure-like activity lasting about 5 to 6 minutes.  On EMS arrival, patient confused.  Patient's aide at bedside denies history of seizure.  Denies any recent medication changes or infection. Aide reports patient had a fall 2 weeks ago, was evaluated at urgent care and sent home. On ED arrival, patient back to neurologic baseline.    CT head 10/12: No evidence of acute intracranial pathology. Prominent parenchymal atrophy for age with medial temporal lobe predominance.

## 2024-10-14 NOTE — DISCHARGE NOTE PROVIDER - HOSPITAL COURSE
Patient is a 59 year old male with a  significant past medical history of HLD, mitral regurgitation, dementia, and moderate developmental delay who presented to the hospital for AMS for an episode of syncope with seizure-like activity that lasted for 5-6 minutes at the [facility]. Basic lab work and UA performed and reviewed with no significant findings. Head CT showed no evidence of acute intracranial pathology. CXR revealed no radiographic evidence of acute cardiopulmonary disease. vEEG showed diffuse slowing and no seizure activity. Neurology consulted, no further seizure-liked activity or cause found. Patient has returned to baseline mental status during admission and is cleared for discharge.

## 2024-10-15 PROCEDURE — 99223 1ST HOSP IP/OBS HIGH 75: CPT

## 2024-10-15 PROCEDURE — 99233 SBSQ HOSP IP/OBS HIGH 50: CPT

## 2024-10-15 RX ADMIN — Medication 5000 UNIT(S): at 17:01

## 2024-10-15 RX ADMIN — PANTOPRAZOLE SODIUM 40 MILLIGRAM(S): 40 TABLET, DELAYED RELEASE ORAL at 05:15

## 2024-10-15 RX ADMIN — Medication 81 MILLIGRAM(S): at 11:28

## 2024-10-15 RX ADMIN — ATORVASTATIN CALCIUM 10 MILLIGRAM(S): 10 TABLET, FILM COATED ORAL at 21:14

## 2024-10-15 RX ADMIN — Medication 5000 UNIT(S): at 05:15

## 2024-10-15 NOTE — PROGRESS NOTE ADULT - SUBJECTIVE AND OBJECTIVE BOX
SUBJECTIVE:    Patient is a 59y old Male who presents with a chief complaint of seizure (14 Oct 2024 15:09)    Currently admitted to medicine with the primary diagnosis of Seizure       Today is hospital day 3d. This morning he is resting comfortably in bed         PAST MEDICAL & SURGICAL HISTORY  Hyperlipemia    Mitral regurgitation    Moderate developmental delay    Dementia    No significant past surgical history      SOCIAL HISTORY:    ALLERGIES:  No Known Allergies    MEDICATIONS:  STANDING MEDICATIONS  aspirin enteric coated 81 milliGRAM(s) Oral daily  atorvastatin 10 milliGRAM(s) Oral at bedtime  heparin   Injectable 5000 Unit(s) SubCutaneous every 12 hours  pantoprazole    Tablet 40 milliGRAM(s) Oral before breakfast    PRN MEDICATIONS  acetaminophen     Tablet .. 650 milliGRAM(s) Oral every 6 hours PRN  ondansetron Injectable 4 milliGRAM(s) IV Push every 6 hours PRN  senna 2 Tablet(s) Oral at bedtime PRN    VITALS:   T(F): 97.7  HR: 74  BP: 98/65  RR: 18  SpO2: 100%    LABS:      RADIOLOGY:    PHYSICAL EXAM:  GEN: No acute distress  HEENT: normocephalic, atraumatic, aniceteric  LUNGS: bl breath sounds   HEART: S1/S2 present. RRR, no murmurs  ABD: Soft, non-tender, non-distended. Bowel sounds present  EXT: no edema   NEURO: awake , follows commands     ASSESSMENT AND PLAN:    60 yo male with hx of intellectual disability, HLD, and dementia    # Syncopal episode  #H/O Intellectual disability   - trop neg, ekg   - CTH neg , VEEG negative  - U/A negative , CXR negative   - Orthostatic BP negative  - TTE negative   - Cardio eval appreciated       # H/O HLD - cw statin     dvt/ gi ppx/diet  dispo: from group home  handoff: dc ready pending dispo ; PT fu   - had meeting with group home today they were not willing for dc to rehab or dc home with pt ???  -requested pt fu today and another meeting tomorrow at 1 pm (10/16)    AID (where he resides ([facility] [phone], connie [phone]), and iss[facility] PRICE ([phone])

## 2024-10-16 PROCEDURE — 99232 SBSQ HOSP IP/OBS MODERATE 35: CPT

## 2024-10-16 RX ADMIN — Medication 81 MILLIGRAM(S): at 11:24

## 2024-10-16 RX ADMIN — PANTOPRAZOLE SODIUM 40 MILLIGRAM(S): 40 TABLET, DELAYED RELEASE ORAL at 05:59

## 2024-10-16 RX ADMIN — Medication 5000 UNIT(S): at 17:01

## 2024-10-16 RX ADMIN — Medication 5000 UNIT(S): at 06:00

## 2024-10-16 RX ADMIN — ATORVASTATIN CALCIUM 10 MILLIGRAM(S): 10 TABLET, FILM COATED ORAL at 21:17

## 2024-10-16 NOTE — PROGRESS NOTE ADULT - SUBJECTIVE AND OBJECTIVE BOX
PAM PIERRE  59y  Male      Patient is a 59y old  Male who presents with a chief complaint of seizure (15 Oct 2024 14:12)      INTERVAL HPI/OVERNIGHT EVENTS: no acute events overnight. no nursing concerns.            T(C): 36.4 (10-16-24 @ 05:07), Max: 36.9 (10-15-24 @ 20:05)  HR: 65 (10-16-24 @ 05:07) (65 - 75)  BP: 101/71 (10-16-24 @ 05:07) (101/67 - 101/71)  RR: 16 (10-16-24 @ 05:07) (16 - 18)  SpO2: 99% (10-16-24 @ 05:07) (99% - 100%)  Wt(kg): --Vital Signs Last 24 Hrs  T(C): 36.4 (16 Oct 2024 05:07), Max: 36.9 (15 Oct 2024 20:05)  T(F): 97.5 (16 Oct 2024 05:07), Max: 98.5 (15 Oct 2024 20:05)  HR: 65 (16 Oct 2024 05:07) (65 - 75)  BP: 101/71 (16 Oct 2024 05:07) (101/67 - 101/71)  BP(mean): --  RR: 16 (16 Oct 2024 05:07) (16 - 18)  SpO2: 99% (16 Oct 2024 05:07) (99% - 100%)    Parameters below as of 15 Oct 2024 20:05  Patient On (Oxygen Delivery Method): room air      PHYSICAL EXAM:  GENERAL: elder-middle age M  PSYCH: no agitation, baseline mentation  NERVOUS SYSTEM:  Alert & Oriented Xto self  PULMONARY: symmetrical chest rise, no accessory muscle use  EXTREMITIES:  2+ Peripheral Pulses, No clubbing, cyanosis, or edema  SKIN: No rashes or lesions    Consultant(s) Notes Reviewed:  [x ] YES  [ ] NO    Discussed with Consultants/Other Providers [ x] YES     LABS  - no labs      RADIOLOGY & ADDITIONAL TESTS:  - no images   Imaging Personally Reviewed:  [ ] YES  [ ] NO    HEALTH ISSUES - PROBLEM Dx:      MEDICATIONS  (STANDING):  aspirin enteric coated 81 milliGRAM(s) Oral daily  atorvastatin 10 milliGRAM(s) Oral at bedtime  heparin   Injectable 5000 Unit(s) SubCutaneous every 12 hours  pantoprazole    Tablet 40 milliGRAM(s) Oral before breakfast    MEDICATIONS  (PRN):  acetaminophen     Tablet .. 650 milliGRAM(s) Oral every 6 hours PRN Temp greater or equal to 38C (100.4F), Mild Pain (1 - 3)  LORazepam   Injectable 2 milliGRAM(s) IntraMuscular once PRN seizure  ondansetron Injectable 4 milliGRAM(s) IV Push every 6 hours PRN Nausea  senna 2 Tablet(s) Oral at bedtime PRN Constipation

## 2024-10-17 PROCEDURE — 99231 SBSQ HOSP IP/OBS SF/LOW 25: CPT

## 2024-10-17 RX ADMIN — PANTOPRAZOLE SODIUM 40 MILLIGRAM(S): 40 TABLET, DELAYED RELEASE ORAL at 05:42

## 2024-10-17 RX ADMIN — Medication 5000 UNIT(S): at 17:18

## 2024-10-17 RX ADMIN — ATORVASTATIN CALCIUM 10 MILLIGRAM(S): 10 TABLET, FILM COATED ORAL at 21:26

## 2024-10-17 RX ADMIN — Medication 81 MILLIGRAM(S): at 08:59

## 2024-10-17 NOTE — PROGRESS NOTE ADULT - SUBJECTIVE AND OBJECTIVE BOX
PAM PIERRE  59y  Male      Patient is a 59y old  Male who presents with a chief complaint of seizure (16 Oct 2024 14:01)      INTERVAL HPI/OVERNIGHT EVENTS: no acute events overnight. no major concerns from nursing.            T(C): 36.3 (10-17-24 @ 05:09), Max: 36.8 (10-16-24 @ 20:10)  HR: 62 (10-17-24 @ 05:09) (62 - 85)  BP: 112/70 (10-17-24 @ 05:09) (112/70 - 131/82)  RR: 16 (10-17-24 @ 05:09) (16 - 18)  SpO2: 100% (10-16-24 @ 20:10) (100% - 100%)  Wt(kg): --Vital Signs Last 24 Hrs  T(C): 36.3 (17 Oct 2024 05:09), Max: 36.8 (16 Oct 2024 20:10)  T(F): 97.4 (17 Oct 2024 05:09), Max: 98.2 (16 Oct 2024 20:10)  HR: 62 (17 Oct 2024 05:09) (62 - 85)  BP: 112/70 (17 Oct 2024 05:09) (112/70 - 131/82)  BP(mean): --  RR: 16 (17 Oct 2024 05:09) (16 - 18)  SpO2: 100% (16 Oct 2024 20:10) (100% - 100%)    Parameters below as of 16 Oct 2024 20:10  Patient On (Oxygen Delivery Method): room air      PHYSICAL EXAM:  GENERAL: elder-middle age M  PSYCH: no agitation, baseline mentation  NERVOUS SYSTEM:  Alert & Oriented Xto self  PULMONARY: symmetrical chest rise, no accessory muscle use  EXTREMITIES:  2+ Peripheral Pulses, No clubbing, cyanosis, or edema  SKIN: No rashes or lesions      Consultant(s) Notes Reviewed:  [x ] YES  [ ] NO    Discussed with Consultants/Other Providers [ x] YES     LABS  - no labs 10/17    RADIOLOGY & ADDITIONAL TESTS:  - no images  Imaging Personally Reviewed:  [ ] YES  [ ] NO    HEALTH ISSUES - PROBLEM Dx:    MEDICATIONS  (STANDING):  aspirin enteric coated 81 milliGRAM(s) Oral daily  atorvastatin 10 milliGRAM(s) Oral at bedtime  heparin   Injectable 5000 Unit(s) SubCutaneous every 12 hours  pantoprazole    Tablet 40 milliGRAM(s) Oral before breakfast    MEDICATIONS  (PRN):  acetaminophen     Tablet .. 650 milliGRAM(s) Oral every 6 hours PRN Temp greater or equal to 38C (100.4F), Mild Pain (1 - 3)  LORazepam   Injectable 2 milliGRAM(s) IntraMuscular once PRN seizure  ondansetron Injectable 4 milliGRAM(s) IV Push every 6 hours PRN Nausea  senna 2 Tablet(s) Oral at bedtime PRN Constipation

## 2024-10-18 ENCOUNTER — TRANSCRIPTION ENCOUNTER (OUTPATIENT)
Age: 59
End: 2024-10-18

## 2024-10-18 VITALS
DIASTOLIC BLOOD PRESSURE: 70 MMHG | TEMPERATURE: 98 F | RESPIRATION RATE: 18 BRPM | HEART RATE: 93 BPM | SYSTOLIC BLOOD PRESSURE: 113 MMHG | OXYGEN SATURATION: 99 %

## 2024-10-18 PROCEDURE — 99239 HOSP IP/OBS DSCHRG MGMT >30: CPT

## 2024-10-18 RX ORDER — SENNOSIDES 8.6 MG
2 TABLET ORAL
Qty: 0 | Refills: 0 | DISCHARGE
Start: 2024-10-18

## 2024-10-18 RX ADMIN — PANTOPRAZOLE SODIUM 40 MILLIGRAM(S): 40 TABLET, DELAYED RELEASE ORAL at 05:08

## 2024-10-18 RX ADMIN — Medication 81 MILLIGRAM(S): at 11:52

## 2024-10-18 NOTE — CONSULT NOTE ADULT - ASSESSMENT
59-y/o male with intellectual disability, dyslipidemia presents to the emergency department status post suspected seizure episode from group home.    Plan  pt presents for witnesses seizure-like activity lasting 5-6mins followed by a postictal per report  patient is confused and unable to add to history. currently on 1:1   ECHO with no hd significant valvular disease  Troponin WNL  orthostatic vitals negative  ecg from 10/12 with nonspecific T,   can repeat ecg today  continue with home meds  f/u with neuro  
IMPRESSION: Rehab of 58 y/o  m  rehab  for  gd  debility  r/o sz  dis     PRECAUTIONS: [  ] Cardiac  [  ] Respiratory  [  ] Seizures [  ] Contact Isolation  [  ] Droplet Isolation  [ FALL ] Other    Weight Bearing Status:     RECOMMENDATION:    Out of Bed to Chair     DVT/Decubiti Prophylaxis    REHAB PLAN:     [ x  ] Bedside P/T 3-5 times a week   [x   ]   Bedside O/T  2-3 times a week             [   ] No Rehab Therapy Indicated                   [   ]  Speech Therapy   Conditioning/ROM                                    ADL  Bed Mobility                                               Conditioning/ROM  Transfers                                                     Bed Mobility  Sitting /Standing Balance                         Transfers                                        Gait Training                                               Sitting/Standing Balance  Stair Training [   ]Applicable                    Home equipment Eval                                                                        Splinting  [   ] Only      GOALS:   ADL   [  x ]   Independent                    Transfers  [  x ] Independent                          Ambulation  [ x  ] Independent     [    ] With device                            [  x ]  CG                                                         [  x ]  CG                                                                  [ x  ] CG                            [    ] Min A                                                   [   ] Min A                                                              [   ] Min  A          DISCHARGE PLAN:   [   ]  Good candidate for Intensive Rehabilitation/Hospital based-4A [facility]                                             Will tolerate 3hrs Intensive Rehab Daily                                       [ xx ]  Short Term Rehab in Skilled Nursing Facility this  d/w  rehab  teAM                                        [    ]  Home with Outpatient or VN services                                         [    ]  Possible Candidate for Intensive Hospital based Rehab                                       
Impression:  59 year old gentleman with a PMH of HLD and intellectual disability presents to the ED for evaluation of altered mental status.  Patient was at [facility] today when he had a syncopal episode.  Patient was caught by staff members and lowered to the floor.  Patient did not fall, no head trauma.  Staff reports patient subsequently had generalized shaking lasting about 5 to 6 minutes.  On EMS arrival, patient confused.  Patient's aide at bedside denies history of seizure.  Back to baseline in ED. Patient without history of seizure. Etiology of symptoms may be due to convulsive syncope, or epileptiform cause.     Suggestion:  Syncope workup.  UA C+S  If cleared medically and without UTI  Discussed with Dr. Finley. If does not need telemetry, can have VEEG at the [facility].

## 2024-10-18 NOTE — PROGRESS NOTE ADULT - ASSESSMENT
58 yo male with hx of intellectual disability, HLD, and dementia    # Syncopal episode  #H/O Intellectual disability   - trop neg, ekg   - CTH neg , VEEG negative  - U/A negative , CXR negative   - Orthostatic BP negative  - TTE negative   - Cardio eval appreciated       # H/O HLD - cw statin     dvt/ gi ppx/diet  dispo: from group home  handoff: dc ready pending dispo ; PT fu   - had meeting with group [place] today they were not willing for dc to rehab or dc home with pt. ongoing dispo planning.     AID (where he resides ([facility] [phone], connie [phone]), and iss[facility] ([phone])    
58 yo male with hx of intellectual disability, HLD, and dementia    # Syncopal episode  #H/O Intellectual disability   - trop neg, ekg   - CTH neg , VEEG negative  - U/A negative , CXR negative   - Orthostatic BP negative  - TTE negative   - Cardio eval appreciated       # H/O HLD - cw statin     dvt/ gi ppx/diet  dispo: from group home  handoff: dc ready pending dispo ; PT fu   - had meeting with group [place] today they were not willing for dc to rehab or dc home with pt. ongoing dispo planning.     AID (where he resides ([facility] [phone], connie [phone]), and issac PRICE ([phone])  
59 year old admitted for workup near syncope. VEEG with nonspecific slowing over 48 hours. No evidence of seizure activity    Will discontinue VEEG  Clear from Epilepsy standpoint to discharge home
58 yo male with hx of intellectual disability, HLD, and dementia    # Syncopal episode  #H/O Intellectual disability   - trop neg, ekg   - CTH neg , VEEG negative  - U/A negative , CXR negative   - Orthostatic BP negative  - TTE negative   - Cardio eval appreciated       # H/O HLD - cw statin     dvt/ gi ppx/diet  dispo: from group home  handoff: dc ready pending dispo ; PT fu   - had meeting with group [place] today they were not willing for dc to rehab or dc home with pt. ongoing dispo planning.     AID (where he resides (O[facility] [phone], connie [phone]), and issac [facility] ([phone])
Patient is 60 yo male with hx of intellectual disability, HLD, and dementia      #Seizure like activity  -Head CT scan shows no acute process  -neurology recommends vEEG  -Seizure precautions  -Ativan PRN  -neurology to follow up   -Orthostatic BP, and echo  -UA negative.  follow up Urine culture     #HLD/Dementia  -Continue with home medications    #Progress Note Handoff  Pending (specify):  home   Family discussion:   Disposition: PT  AID (where he resides (Omar [phone], connie [phone]), and issac PRICE ([phone])

## 2024-10-18 NOTE — PROGRESS NOTE ADULT - SUBJECTIVE AND OBJECTIVE BOX
PAM PIERRE  59y  Male      Patient is a 59y old  Male who presents with a chief complaint of seizure (18 Oct 2024 09:54)      INTERVAL HPI/OVERNIGHT EVENTS: no acute events overnight.         T(C): 36.4 (10-18-24 @ 05:04), Max: 36.7 (10-17-24 @ 13:27)  HR: 80 (10-18-24 @ 05:04) (80 - 96)  BP: 122/87 (10-18-24 @ 05:04) (97/66 - 122/87)  RR: 16 (10-18-24 @ 05:04) (16 - 18)  SpO2: 95% (10-18-24 @ 05:04) (95% - 98%)  Wt(kg): --Vital Signs Last 24 Hrs  T(C): 36.4 (18 Oct 2024 05:04), Max: 36.7 (17 Oct 2024 13:27)  T(F): 97.5 (18 Oct 2024 05:04), Max: 98 (17 Oct 2024 13:27)  HR: 80 (18 Oct 2024 05:04) (80 - 96)  BP: 122/87 (18 Oct 2024 05:04) (97/66 - 122/87)  BP(mean): --  RR: 16 (18 Oct 2024 05:04) (16 - 18)  SpO2: 95% (18 Oct 2024 05:04) (95% - 98%)    Parameters below as of 17 Oct 2024 20:10  Patient On (Oxygen Delivery Method): room air      PHYSICAL EXAM:  GENERAL: elder-middle age M  PSYCH: no agitation, baseline mentation  NERVOUS SYSTEM:  Alert & Oriented Xto self  PULMONARY: symmetrical chest rise, no accessory muscle use  EXTREMITIES:  2+ Peripheral Pulses, No clubbing, cyanosis, or edema  SKIN: No rashes or lesions    Consultant(s) Notes Reviewed:  [x ] YES  [ ] NO    Discussed with Consultants/Other Providers [ x] YES     LABS  - no labs    RADIOLOGY & ADDITIONAL TESTS:  - no images 10/18  Imaging Personally Reviewed:  [ ] YES  [ ] NO    HEALTH ISSUES - PROBLEM Dx:      MEDICATIONS  (STANDING):  aspirin enteric coated 81 milliGRAM(s) Oral daily  atorvastatin 10 milliGRAM(s) Oral at bedtime  heparin   Injectable 5000 Unit(s) SubCutaneous every 12 hours  pantoprazole    Tablet 40 milliGRAM(s) Oral before breakfast    MEDICATIONS  (PRN):  acetaminophen     Tablet .. 650 milliGRAM(s) Oral every 6 hours PRN Temp greater or equal to 38C (100.4F), Mild Pain (1 - 3)  LORazepam   Injectable 2 milliGRAM(s) IntraMuscular once PRN seizure  ondansetron Injectable 4 milliGRAM(s) IV Push every 6 hours PRN Nausea  senna 2 Tablet(s) Oral at bedtime PRN Constipation

## 2024-10-18 NOTE — CONSULT NOTE ADULT - SUBJECTIVE AND OBJECTIVE BOX
HPI:  Patient is 59-year-old male PMH HLD and intellectual disability presents to the ED for evaluation of altered mental status.  Patient was at adult  center today when he had a syncopal episode.  Patient was caught by staff members and lowered to the floor.  Patient did not fall, no head trauma.  Staff reports patient subsequently had seizure-like activity lasting about 5 to 6 minutes.  On EMS arrival, patient confused.  Patient's aide at bedside denies history of seizure.  Denies any recent medication changes or infection. Aide reports patient had a fall 2 weeks ago, was evaluated at urgent care and sent home. On ED arrival, patient back to neurologic baseline. (12 Oct 2024 21:10)            PAST MEDICAL & SURGICAL HISTORY  Hyperlipemia    Mitral regurgitation    Moderate developmental delay    Dementia    No significant past surgical history        FAMILY HISTORY:  FAMILY HISTORY:      SOCIAL HISTORY:  []smoker  []Alcohol  []Drug    ALLERGIES:  No Known Allergies      MEDICATIONS:  MEDICATIONS  (STANDING):  aspirin enteric coated 81 milliGRAM(s) Oral daily  atorvastatin 10 milliGRAM(s) Oral at bedtime  heparin   Injectable 5000 Unit(s) SubCutaneous every 12 hours  pantoprazole    Tablet 40 milliGRAM(s) Oral before breakfast    MEDICATIONS  (PRN):  acetaminophen     Tablet .. 650 milliGRAM(s) Oral every 6 hours PRN Temp greater or equal to 38C (100.4F), Mild Pain (1 - 3)  ondansetron Injectable 4 milliGRAM(s) IV Push every 6 hours PRN Nausea  senna 2 Tablet(s) Oral at bedtime PRN Constipation      HOME MEDICATIONS:  Home Medications:  aspirin 81 mg oral delayed release tablet: 1 tab(s) orally once a day (12 Oct 2024 20:58)  simvastatin 20 mg oral tablet: 1 tab(s) orally once a day (at bedtime) (18 Apr 2018 09:25)  Vitamin D3 1000 intl units oral tablet: 1 tab(s) orally once a day (18 Apr 2018 09:25)      VITALS:   T(F): 98.3 (10-14 @ 12:45), Max: 99 (10-12 @ 11:24)  HR: 70 (10-14 @ 12:45) (68 - 99)  BP: 106/71 (10-14 @ 12:45) (106/71 - 130/86)  BP(mean): --  RR: 18 (10-14 @ 12:45) (16 - 18)  SpO2: 97% (10-14 @ 05:00) (92% - 100%)    I&O's Summary    13 Oct 2024 07:01  -  14 Oct 2024 07:00  --------------------------------------------------------  IN: 0 mL / OUT: 400 mL / NET: -400 mL        REVIEW OF SYSTEMS:  CONSTITUTIONAL: No weakness, fevers or chills  EYES: No visual changes  ENT: No vertigo or throat pain   NECK: No pain or stiffness  RESPIRATORY: No cough, wheezing, hemoptysis; No shortness of breath  CARDIOVASCULAR: No chest pain or palpitations  GASTROINTESTINAL: No abdominal or epigastric pain. No nausea, vomiting, or hematemesis; No diarrhea or constipation. No melena or hematochezia.  GENITOURINARY: No dysuria, frequency or hematuria  NEUROLOGICAL: No numbness or weakness  SKIN: No itching, no rashes  MSK: no    PHYSICAL EXAM:  NEURO: patient is awake , alert and oriented  GEN: Not in acute distress  NECK: no thyroid enlargement, no JVD  LUNGS: Clear to auscultation bilaterally   CARDIOVASCULAR: S1/S2 present, RRR , no murmurs or rubs, no carotid bruits,  + PP bilaterally  ABD: Soft, non-tender, non-distended, +BS  EXT: No CHRISTINE  SKIN: Intact    LABS:                    Troponin trend:            RADIOLOGY:  < from: TTE Echo Complete w/o Contrast w/ Doppler (10.14.24 @ 10:41) >  Summary:   1. Normal global left ventricular systolic function.   2. Left ventricular ejection fraction, by visual estimation, is 55 to   60%.   3. Normal right ventricular size and function.   4. Normal left atrial size.   5. Normal right atrial size.   6. Mild aortic regurgitation.   7. No evidence of mitral valve regurgitation.   8. AdequateTR velocity was not obtained to accurately assess RVSP.   9. There is no evidence of pericardial effusion.    PHYSICIAN INTERPRETATION:  Left Ventricle: The left ventricular internal cavity size is normal.   There is no left ventricular hypertrophy. Global LV systolic function was   normal. Left ventricular ejection fraction, by visual estimation, is 55   to 60%. Normal segmental left ventricular systolic function. Spectral   Doppler shows normal pattern of LV diastolic filling. Normal LV filling  pressures.  Right Ventricle: Normal right ventricular size and function. The right   ventricular size is normal. RV wall thickness is normal. RV systolic   function is normal. TV S' 0.2 m/s.  Left Atrium: Normal left atrial size.  Right Atrium: Normal right atrial size.  Pericardium: There is no evidence of pericardial effusion.  Mitral Valve: The mitral valve is normal in structure. No evidence of   mitral stenosis. No evidence of mitral valve regurgitation is seen.  Tricuspid Valve: Structurally normal tricuspid valve, with normal leaflet   excursion. No tricuspid regurgitation is visualized. Adequate TR velocity   was not obtained to accurately assess RVSP.  Aortic Valve: The aortic valve is trileaflet. No evidence of aortic   stenosis. Mild aortic valve regurgitation is seen.  Pulmonic Valve: The pulmonic valve was not well visualized. Trace   pulmonic valve regurgitation.  Aorta: The aortic root and ascending aorta are structurally normal, with   no evidence of dilitation.  Venous:The inferior vena cava is normal. The inferior vena cava was   normal sized, with respiratory size variation greater than 50%.    < end of copied text >  < from: TTE Echo Complete w/o Contrast w/ Doppler (10.14.24 @ 10:41) >  Summary:   1. Normal global left ventricular systolic function.   2. Left ventricular ejection fraction, by visual estimation, is 55 to   60%.   3. Normal right ventricular size and function.   4. Normal left atrial size.   5. Normal right atrial size.   6. Mild aortic regurgitation.   7. No evidence of mitral valve regurgitation.   8. AdequateTR velocity was not obtained to accurately assess RVSP.   9. There is no evidence of pericardial effusion.    PHYSICIAN INTERPRETATION:  Left Ventricle: The left ventricular internal cavity size is normal.   There is no left ventricular hypertrophy. Global LV systolic function was   normal. Left ventricular ejection fraction, by visual estimation, is 55   to 60%. Normal segmental left ventricular systolic function. Spectral   Doppler shows normal pattern of LV diastolic filling. Normal LV filling  pressures.  Right Ventricle: Normal right ventricular size and function. The right   ventricular size is normal. RV wall thickness is normal. RV systolic   function is normal. TV S' 0.2 m/s.  Left Atrium: Normal left atrial size.  Right Atrium: Normal right atrial size.  Pericardium: There is no evidence of pericardial effusion.  Mitral Valve: The mitral valve is normal in structure. No evidence of   mitral stenosis. No evidence of mitral valve regurgitation is seen.  Tricuspid Valve: Structurally normal tricuspid valve, with normal leaflet   excursion. No tricuspid regurgitation is visualized. Adequate TR velocity   was not obtained to accurately assess RVSP.  Aortic Valve: The aortic valve is trileaflet. No evidence of aortic   stenosis. Mild aortic valve regurgitation is seen.  Pulmonic Valve: The pulmonic valve was not well visualized. Trace   pulmonic valve regurgitation.  Aorta: The aortic root and ascending aorta are structurally normal, with   no evidence of dilitation.  Venous:The inferior vena cava is normal. The inferior vena cava was   normal sized, with respiratory size variation greater than 50%.    < end of copied text >      ECG:  < from: 12 Lead ECG (10.12.24 @ 13:27) >  Ventricular Rate 66 BPM    Atrial Rate 66 BPM    P-R Interval 102 ms    QRS Duration 90 ms    Q-T Interval 402 ms    QTC Calculation(Bazett) 421 ms    P Axis 42 degrees    R Axis 30 degrees    T Axis -17 degrees    Diagnosis Line Sinus rhythm with short NV  Cannot rule out Inferior infarct , age undetermined  Abnormal ECG    Confirmed by Behuria, Supreeti ([id number]) on 10/12/2024 3:26:27 PM    < end of copied text >    TELEMETRY EVENTS:  
Neurology Consult    Patient is a 59y old  Male who presents with a chief complaint of generalized shaking    HPI:  59 year old gentleman with a PMH of HLD and intellectual disability presents to the ED for evaluation of altered mental status.  Patient was at adult  center today when he had a syncopal episode.  Patient was caught by staff members and lowered to the floor.  Patient did not fall, no head trauma.  Staff reports patient subsequently had seizure-like activity lasting about 5 to 6 minutes.  On EMS arrival, patient confused.  Patient's aide at bedside denies history of seizure.  Back to baseline in ED.     PAST MEDICAL & SURGICAL HISTORY:  Hyperlipemia      Mitral regurgitation      Moderate developmental delay      No significant past surgical history          FAMILY HISTORY:      Social History: (-) x 3    Allergies    No Known Allergies    Intolerances        MEDICATIONS  (STANDING):    MEDICATIONS  (PRN):      Vital Signs Last 24 Hrs  T(C): 36.4 (12 Oct 2024 15:43), Max: 37.2 (12 Oct 2024 11:24)  T(F): 97.6 (12 Oct 2024 15:43), Max: 99 (12 Oct 2024 11:24)  HR: 72 (12 Oct 2024 15:43) (72 - 99)  BP: 130/86 (12 Oct 2024 15:43) (114/74 - 130/86)  BP(mean): --  RR: 18 (12 Oct 2024 15:43) (18 - 18)  SpO2: 100% (12 Oct 2024 15:43) (98% - 100%)    Parameters below as of 12 Oct 2024 15:43  Patient On (Oxygen Delivery Method): room air        Examination:  As per aide patient at baseline  Cognitive/Language:  The patient is oriented to self    Eyes: able to identify appropriate number of fingers in each visual field.    PERRL.  No ptosis/weakness of eyelid closure.    Face:  Facial sensation normal V1 - 3, no facial asymmetry.    Formal Muscle Strength Testing: (MRC grade R/L) 5/5 UE; 5/5 LE.  No Exam limited by baseline cognitive dysfunction    Labs:   CBC Full  -  ( 12 Oct 2024 12:00 )  WBC Count : 6.45 K/uL  RBC Count : 5.00 M/uL  Hemoglobin : 16.4 g/dL  Hematocrit : 47.2 %  Platelet Count - Automated : 239 K/uL  Mean Cell Volume : 94.4 fL  Mean Cell Hemoglobin : 32.8 pg  Mean Cell Hemoglobin Concentration : 34.7 g/dL  Auto Neutrophil # : 3.94 K/uL  Auto Lymphocyte # : 1.62 K/uL  Auto Monocyte # : 0.73 K/uL  Auto Eosinophil # : 0.07 K/uL  Auto Basophil # : 0.07 K/uL  Auto Neutrophil % : 61.1 %  Auto Lymphocyte % : 25.1 %  Auto Monocyte % : 11.3 %  Auto Eosinophil % : 1.1 %  Auto Basophil % : 1.1 %    10-12    140  |  101  |  20  ----------------------------<  75  4.4   |  24  |  1.3    Ca    10.1      12 Oct 2024 12:00  Mg     2.1     10-12    TPro  7.9  /  Alb  4.6  /  TBili  1.6[H]  /  DBili  x   /  AST  29  /  ALT  44[H]  /  AlkPhos  106  10-12    LIVER FUNCTIONS - ( 12 Oct 2024 12:00 )  Alb: 4.6 g/dL / Pro: 7.9 g/dL / ALK PHOS: 106 U/L / ALT: 44 U/L / AST: 29 U/L / GGT: x             Urinalysis Basic - ( 12 Oct 2024 16:34 )    Color: Yellow / Appearance: Clear / S.009 / pH: x  Gluc: x / Ketone: Negative mg/dL  / Bili: Negative / Urobili: 0.2 mg/dL   Blood: x / Protein: Negative mg/dL / Nitrite: Negative   Leuk Esterase: Negative / RBC: x / WBC x   Sq Epi: x / Non Sq Epi: x / Bacteria: x          Neuroimaging:  NCHCT: CT Head No Cont:   ACC: [id number] EXAM:  CT BRAIN   ORDERED BY: YAMILEX DARDEN     IMPRESSION:    1.  No evidence of acute intracranial pathology.    2.  Prominent parenchymal atrophy for age with medial temporal lobe   predominance.  
HPI: 59-year-old male PMH HLD and intellectual disability presents to the ED for evaluation of altered mental status.  Patient was at adult  center today when he had a syncopal episode.  Patient was caught by staff members and lowered to the floor.  Patient did not fall, no head trauma.  Staff reports patient subsequently had seizure-like activity lasting about 5 to 6 minutes.  On EMS arrival, patient confused.  Patient's aide at bedside denies history of seizure.  Denies any recent medication changes or infection. Aide reports patient had a fall 2 weeks ago, was evaluated at urgent care and sent home. On ED arrival, patient back to neurologic baseline. ptn seen and exam  at  bed  side nad  ptn  labs  ,imaging  and  medical  and rehab  notes  are appreciated  and  reviewed  .     PTN  REFERRED TO ACUTE  REHAB  FOR  EVAL AND  TX   PAST MEDICAL & SURGICAL HISTORY:  Hyperlipemia      Mitral regurgitation      Moderate developmental delay      Dementia      No significant past surgical history          Hospital Course:    TODAY'S SUBJECTIVE & REVIEW OF SYMPTOMS:     Constitutional WNL   Cardio WNL   Resp WNL   GI WNL  Heme WNL  Endo WNL  Skin WNL  MSK WNL  Neuro WNL  Cognitive WNL  Psych WNL      MEDICATIONS  (STANDING):  aspirin enteric coated 81 milliGRAM(s) Oral daily  atorvastatin 10 milliGRAM(s) Oral at bedtime  heparin   Injectable 5000 Unit(s) SubCutaneous every 12 hours  pantoprazole    Tablet 40 milliGRAM(s) Oral before breakfast    MEDICATIONS  (PRN):  acetaminophen     Tablet .. 650 milliGRAM(s) Oral every 6 hours PRN Temp greater or equal to 38C (100.4F), Mild Pain (1 - 3)  LORazepam   Injectable 2 milliGRAM(s) IntraMuscular once PRN seizure  ondansetron Injectable 4 milliGRAM(s) IV Push every 6 hours PRN Nausea  senna 2 Tablet(s) Oral at bedtime PRN Constipation      FAMILY HISTORY:      Allergies    No Known Allergies    Intolerances        SOCIAL HISTORY:    [  ] Etoh  [  ] Smoking  [  ] Substance abuse     Home Environment:  [  ] Home Alone  [ x ] Lives with Family  [  x] Home Health Aid gp  home      Dwelling:  [  ] Apartment  [x  ] Private House  [  ] Adult Home  [  ] Skilled Nursing Facility      [  ] Short Term  [  ] Long Term  [ x ] Stairs       Elevator [  ]    FUNCTIONAL STATUS PTA: (Check all that apply)  Ambulation: [   x]Independent    [  ] Dependent     [  ] Non-Ambulatory  Assistive Device: [  ] SA Cane  [  ]  Q Cane  [  ] Walker  [  ]  Wheelchair  ADL : [ x ] Independent  [  ]  Dependent       Vital Signs Last 24 Hrs  T(C): 36.4 (18 Oct 2024 05:04), Max: 36.7 (17 Oct 2024 13:27)  T(F): 97.5 (18 Oct 2024 05:04), Max: 98 (17 Oct 2024 13:27)  HR: 80 (18 Oct 2024 05:04) (80 - 96)  BP: 122/87 (18 Oct 2024 05:04) (97/66 - 122/87)  BP(mean): --  RR: 16 (18 Oct 2024 05:04) (16 - 18)  SpO2: 95% (18 Oct 2024 05:04) (95% - 98%)    Parameters below as of 17 Oct 2024 20:10  Patient On (Oxygen Delivery Method): room air          PHYSICAL EXAM: Alert & Oriented X 1  GENERAL: NAD, well-groomed, well-developed  HEAD:  Atraumatic, Normocephalic  EYES: EOMI, PERRLA, conjunctiva and sclera clear  NECK: Supple, No JVD, Normal thyroid  CHEST/LUNG: Clear to percussion bilaterally; No rales, rhonchi, wheezing, or rubs  HEART: Regular rate and rhythm; No murmurs, rubs, or gallops  ABDOMEN: Soft, Nontender, Nondistended; Bowel sounds present  EXTREMITIES:  2+ Peripheral Pulses, No clubbing, cyanosis, or edema    NERVOUS SYSTEM:  Cranial Nerves 2-12 intact [ x ] Abnormal  [  ]  ROM: WFL all extremities [  ]  Abnormal [ x ]  Motor Strength: WFL all extremities  [  ]  Abnormal [x  ]  Sensation: intact to light touch [  ] Abnormal [x  ]  Reflexes: Symmetric [  ]  Abnormal [ x ]    FUNCTIONAL STATUS:  Bed Mobility: Independent [  ]  Supervision [  ]  Needs Assistance [x  ]  N/A [  ]  Transfers: Independent [  ]  Supervision [  ]  Needs Assistance [  x]  N/A [  ]   Ambulation: Independent [  ]  Supervision [  ]  Needs Assistance [  x]  N/A [  ]  ADL: Independent [  ] Requires Assistance [  ] N/A [  ]  SEE PT/OT IE NOTES    LABS:                RADIOLOGY & ADDITIONAL STUDIES:< from: CT Head No Cont (10.12.24 @ 12:21) >  IMPRESSION:    1.  No evidence of acute intracranial pathology.    2.  Prominent parenchymal atrophy for age with medial temporal lobe   predominance.    < end of copied text >      Assesment:

## 2024-10-18 NOTE — DISCHARGE NOTE NURSING/CASE MANAGEMENT/SOCIAL WORK - PATIENT PORTAL LINK FT
You can access the FollowMyHealth Patient Portal offered by [facility] by registering at the following website: [email or web address]. By joining [employer]’s FollowMyHealth portal, you will also be able to view your health information using other applications (apps) compatible with our system.

## 2024-10-18 NOTE — DISCHARGE NOTE NURSING/CASE MANAGEMENT/SOCIAL WORK - FINANCIAL ASSISTANCE
[facility] provides services at a reduced cost to those who are determined to be eligible through [facility]’s financial assistance program. Information regarding [facility]’s financial assistance program can be found by going to [email or web address] or by calling [phone].

## 2024-10-25 DIAGNOSIS — F03.90 UNSPECIFIED DEMENTIA, UNSPECIFIED SEVERITY, WITHOUT BEHAVIORAL DISTURBANCE, PSYCHOTIC DISTURBANCE, MOOD DISTURBANCE, AND ANXIETY: ICD-10-CM

## 2024-10-25 DIAGNOSIS — E78.5 HYPERLIPIDEMIA, UNSPECIFIED: ICD-10-CM

## 2024-10-25 DIAGNOSIS — R55 SYNCOPE AND COLLAPSE: ICD-10-CM

## 2024-10-25 DIAGNOSIS — F81.9 DEVELOPMENTAL DISORDER OF SCHOLASTIC SKILLS, UNSPECIFIED: ICD-10-CM

## 2024-10-25 DIAGNOSIS — Z79.82 LONG TERM (CURRENT) USE OF ASPIRIN: ICD-10-CM

## 2024-10-25 DIAGNOSIS — I34.0 NONRHEUMATIC MITRAL (VALVE) INSUFFICIENCY: ICD-10-CM

## 2024-10-25 DIAGNOSIS — Z79.899 OTHER LONG TERM (CURRENT) DRUG THERAPY: ICD-10-CM

## 2024-10-25 DIAGNOSIS — F79 UNSPECIFIED INTELLECTUAL DISABILITIES: ICD-10-CM

## 2024-11-01 PROBLEM — F03.90 UNSPECIFIED DEMENTIA, UNSPECIFIED SEVERITY, WITHOUT BEHAVIORAL DISTURBANCE, PSYCHOTIC DISTURBANCE, MOOD DISTURBANCE, AND ANXIETY: Chronic | Status: ACTIVE | Noted: 2024-10-12

## 2024-11-12 ENCOUNTER — APPOINTMENT (OUTPATIENT)
Dept: NEUROLOGY | Facility: CLINIC | Age: 59
End: 2024-11-12

## 2024-11-15 ENCOUNTER — OUTPATIENT (OUTPATIENT)
Dept: OUTPATIENT SERVICES | Facility: HOSPITAL | Age: 59
LOS: 1 days | End: 2024-11-15
Payer: COMMERCIAL

## 2024-11-15 DIAGNOSIS — R13.10 DYSPHAGIA, UNSPECIFIED: ICD-10-CM

## 2024-11-15 PROCEDURE — 92610 EVALUATE SWALLOWING FUNCTION: CPT | Mod: GN

## 2024-11-16 DIAGNOSIS — R13.10 DYSPHAGIA, UNSPECIFIED: ICD-10-CM

## 2024-11-18 ENCOUNTER — OUTPATIENT (OUTPATIENT)
Dept: OUTPATIENT SERVICES | Facility: HOSPITAL | Age: 59
LOS: 1 days | End: 2024-11-18
Payer: COMMERCIAL

## 2024-11-18 DIAGNOSIS — R26.9 UNSPECIFIED ABNORMALITIES OF GAIT AND MOBILITY: ICD-10-CM

## 2024-11-18 PROCEDURE — 97162 PT EVAL MOD COMPLEX 30 MIN: CPT | Mod: GP

## 2024-11-19 DIAGNOSIS — R26.9 UNSPECIFIED ABNORMALITIES OF GAIT AND MOBILITY: ICD-10-CM

## 2024-11-26 ENCOUNTER — INPATIENT (INPATIENT)
Facility: HOSPITAL | Age: 59
LOS: 2 days | Discharge: HOME | DRG: 101 | End: 2024-11-29
Attending: STUDENT IN AN ORGANIZED HEALTH CARE EDUCATION/TRAINING PROGRAM | Admitting: HOSPITALIST
Payer: COMMERCIAL

## 2024-11-26 VITALS
OXYGEN SATURATION: 98 % | DIASTOLIC BLOOD PRESSURE: 61 MMHG | HEIGHT: 66 IN | HEART RATE: 70 BPM | SYSTOLIC BLOOD PRESSURE: 111 MMHG | WEIGHT: 134.92 LBS | RESPIRATION RATE: 18 BRPM | TEMPERATURE: 98 F

## 2024-11-26 LAB
ALBUMIN SERPL ELPH-MCNC: 3.9 G/DL — SIGNIFICANT CHANGE UP (ref 3.5–5.2)
ALP SERPL-CCNC: 103 U/L — SIGNIFICANT CHANGE UP (ref 30–115)
ALT FLD-CCNC: 20 U/L — SIGNIFICANT CHANGE UP (ref 0–41)
ANION GAP SERPL CALC-SCNC: 8 MMOL/L — SIGNIFICANT CHANGE UP (ref 7–14)
AST SERPL-CCNC: 21 U/L — SIGNIFICANT CHANGE UP (ref 0–41)
BASOPHILS # BLD AUTO: 0.09 K/UL — SIGNIFICANT CHANGE UP (ref 0–0.2)
BASOPHILS NFR BLD AUTO: 0.9 % — SIGNIFICANT CHANGE UP (ref 0–1)
BILIRUB SERPL-MCNC: 0.6 MG/DL — SIGNIFICANT CHANGE UP (ref 0.2–1.2)
BUN SERPL-MCNC: 15 MG/DL — SIGNIFICANT CHANGE UP (ref 10–20)
CALCIUM SERPL-MCNC: 9.1 MG/DL — SIGNIFICANT CHANGE UP (ref 8.4–10.5)
CHLORIDE SERPL-SCNC: 104 MMOL/L — SIGNIFICANT CHANGE UP (ref 98–110)
CO2 SERPL-SCNC: 26 MMOL/L — SIGNIFICANT CHANGE UP (ref 17–32)
CREAT SERPL-MCNC: 1.3 MG/DL — SIGNIFICANT CHANGE UP (ref 0.7–1.5)
EGFR: 64 ML/MIN/1.73M2 — SIGNIFICANT CHANGE UP
EOSINOPHIL # BLD AUTO: 0.11 K/UL — SIGNIFICANT CHANGE UP (ref 0–0.7)
EOSINOPHIL NFR BLD AUTO: 1.2 % — SIGNIFICANT CHANGE UP (ref 0–8)
GLUCOSE SERPL-MCNC: 107 MG/DL — HIGH (ref 70–99)
HCT VFR BLD CALC: 43.9 % — SIGNIFICANT CHANGE UP (ref 42–52)
HGB BLD-MCNC: 15 G/DL — SIGNIFICANT CHANGE UP (ref 14–18)
IMM GRANULOCYTES NFR BLD AUTO: 0.3 % — SIGNIFICANT CHANGE UP (ref 0.1–0.3)
LYMPHOCYTES # BLD AUTO: 1.64 K/UL — SIGNIFICANT CHANGE UP (ref 1.2–3.4)
LYMPHOCYTES # BLD AUTO: 17.3 % — LOW (ref 20.5–51.1)
MAGNESIUM SERPL-MCNC: 2.3 MG/DL — SIGNIFICANT CHANGE UP (ref 1.8–2.4)
MCHC RBC-ENTMCNC: 32.1 PG — HIGH (ref 27–31)
MCHC RBC-ENTMCNC: 34.2 G/DL — SIGNIFICANT CHANGE UP (ref 32–37)
MCV RBC AUTO: 93.8 FL — SIGNIFICANT CHANGE UP (ref 80–94)
MONOCYTES # BLD AUTO: 1.01 K/UL — HIGH (ref 0.1–0.6)
MONOCYTES NFR BLD AUTO: 10.6 % — HIGH (ref 1.7–9.3)
NEUTROPHILS # BLD AUTO: 6.61 K/UL — HIGH (ref 1.4–6.5)
NEUTROPHILS NFR BLD AUTO: 69.7 % — SIGNIFICANT CHANGE UP (ref 42.2–75.2)
NRBC # BLD: 0 /100 WBCS — SIGNIFICANT CHANGE UP (ref 0–0)
PLATELET # BLD AUTO: 260 K/UL — SIGNIFICANT CHANGE UP (ref 130–400)
PMV BLD: 8.9 FL — SIGNIFICANT CHANGE UP (ref 7.4–10.4)
POTASSIUM SERPL-MCNC: 4.1 MMOL/L — SIGNIFICANT CHANGE UP (ref 3.5–5)
POTASSIUM SERPL-SCNC: 4.1 MMOL/L — SIGNIFICANT CHANGE UP (ref 3.5–5)
PROT SERPL-MCNC: 6.8 G/DL — SIGNIFICANT CHANGE UP (ref 6–8)
RBC # BLD: 4.68 M/UL — LOW (ref 4.7–6.1)
RBC # FLD: 14.4 % — SIGNIFICANT CHANGE UP (ref 11.5–14.5)
SODIUM SERPL-SCNC: 138 MMOL/L — SIGNIFICANT CHANGE UP (ref 135–146)
WBC # BLD: 9.49 K/UL — SIGNIFICANT CHANGE UP (ref 4.8–10.8)
WBC # FLD AUTO: 9.49 K/UL — SIGNIFICANT CHANGE UP (ref 4.8–10.8)

## 2024-11-26 PROCEDURE — 70450 CT HEAD/BRAIN W/O DYE: CPT | Mod: 26,MC

## 2024-11-26 PROCEDURE — 93010 ELECTROCARDIOGRAM REPORT: CPT

## 2024-11-26 PROCEDURE — 99285 EMERGENCY DEPT VISIT HI MDM: CPT

## 2024-11-26 RX ORDER — LORAZEPAM 2 MG/1
2 TABLET ORAL ONCE
Refills: 0 | Status: DISCONTINUED | OUTPATIENT
Start: 2024-11-26 | End: 2024-11-27

## 2024-11-26 RX ORDER — CHOLECALCIFEROL (VITAMIN D3) 10MCG/0.25
1000 DROPS ORAL DAILY
Refills: 0 | Status: DISCONTINUED | OUTPATIENT
Start: 2024-11-26 | End: 2024-11-29

## 2024-11-26 RX ORDER — HEPARIN SODIUM,PORCINE 1000/ML
5000 VIAL (ML) INJECTION EVERY 12 HOURS
Refills: 0 | Status: DISCONTINUED | OUTPATIENT
Start: 2024-11-26 | End: 2024-11-29

## 2024-11-26 NOTE — ED ADULT TRIAGE NOTE - CHIEF COMPLAINT QUOTE
PT BIBA from the group home c/o of seizure like activity about half hour ago. Aide unsure if patient has history of seizure. PT is at baseline mental status right now. Per EMS pt was dancing in back of bus. Pt h/x of down syndrome. Non-verbal at baseline.

## 2024-11-26 NOTE — ED PROVIDER NOTE - OBJECTIVE STATEMENT
Patient is a 59-year-old male with a past medical history of hyperlipidemia and intellectual disability down sydrome presents to the ED for suspected seizure.  At around 12:25 PM today patient was having lunch at a table when he became stiff with his arms up and then was helped to the floor lateral recumbent position when he started shaking, the entire episode lasted about 1 to 2-minute.  Per health aide patient was in a fetal position medially after episode with neck bent to side, not his baseline, but was back to baseline self in approximately 5 to 10 minutes, however is nonverbal at baseline.  Patient did not bite tongue, urinary incontinence or fecal incontinence.  Patient was previously evaluated for similar episode in October, where he received a abnormal EEG, which showed no epileptiform activity, and was discharged.  Patient does not take any anti-epileptic medication.  FSBG was normal.  When asked, patient feels, he gave thumbs up.  Patient was able to follow commands such as sticking out tongue.

## 2024-11-26 NOTE — ED PROVIDER NOTE - BIRTH SEX
Nephrology Progress Note        [address], [address] [address]  Phone: [phone]  Office Hours: 8:30AM - 4:30PM  Monday - Friday 12/27/2022 7:52 AM  Subjective:   Admit Date: 12/25/2022  PCP: Dennise Lam MD  Interval History: On NC  Somewhat better    Diet: ADULT DIET; Regular      Data:   Scheduled Meds:   cefTRIAXone (ROCEPHIN) IV  1,000 mg IntraVENous Q24H    azithromycin  500 mg Oral Daily    carvedilol  3.125 mg Oral Daily    pantoprazole  40 mg Oral QAM AC    sevelamer  800 mg Oral TID WC    sodium chloride flush  5-40 mL IntraVENous 2 times per day    dexamethasone  6 mg IntraVENous Q24H    albuterol sulfate HFA  2 puff Inhalation Q4H    heparin (porcine)  5,000 Units SubCUTAneous 3 times per day     Continuous Infusions:   sodium chloride       PRN Meds:sodium chloride flush, sodium chloride, ondansetron **OR** ondansetron, polyethylene glycol, nicotine polacrilex, acetaminophen **OR** acetaminophen  I/O last 3 completed shifts: In: 740 [P.O.:240]  Out: 2300 [Urine:300]  No intake/output data recorded.     Intake/Output Summary (Last 24 hours) at 12/27/2022 0752  Last data filed at 12/26/2022 1750  Gross per 24 hour   Intake 500 ml   Output 2000 ml   Net -1500 ml       CBC:   Recent Labs     12/25/22  1453 12/27/22  0039   WBC 14.7* 17.8*   HGB 12.1* 10.9*    252       BMP:    Recent Labs     12/25/22  1453 12/26/22  0446 12/27/22  0039   * 129* 136   K 3.9 4.0 3.8   CL 91* 91* 98*   CO2 26 25 25   BUN 35* 48* 31*   CREATININE 4.7* 5.0* 3.6*   GLUCOSE 99 158* 160*     Hepatic:   Recent Labs     12/25/22  1453   AST 28   ALT 16   BILITOT 1.0   ALKPHOS 86       Objective:   Vitals: BP (!) 126/50   Pulse 55   Temp 97.9 °F (36.6 °C) (Oral)   Resp 21   Ht 5' 8\" (1.727 m)   Wt 128 lb 8.5 oz (58.3 kg)   SpO2 95%   BMI 19.54 kg/m²   General appearance: alert and cooperative with exam, in no acute distress  HEENT: normocephalic, atraumatic,   Neck: supple, trachea midline  Lungs: breathing comfortably on nc  Heart[de-identified] regular rate and rhythm,   Abdomen: soft, non-tender; non distended, +bowel sounds  Extremities: extremities atraumatic, no cyanosis or edema  Neurologic: alert, oriented, follows commands, interactive    MEDICAL DECISION MAKING     -Acute hypoxic resp failure from COVID 19  -Hyponatremia  -GPC bacteremia  -RUL nodule: decreasing in size  -ESRD on HD MWF  -CKD-MBD     Suggest:  -HD tomorrow  -On abx for bacteremia, speciation pending  -Will continue to follow  -Limit oral fluids to 1500ml per day    Than you                     Electronically signed by Oscar Hamman, DO on 12/27/2022 at 7:52 AM    [address],  [place]  [facility], [place] [address]  PHONE: [phone]  FAX: [phone] Male

## 2024-11-26 NOTE — ED ADULT NURSE NOTE - NSFALLHARMRISKINTERV_ED_ALL_ED
Assistance OOB with selected safe patient handling equipment if applicable/Assistance with ambulation/Communicate risk of Fall with Harm to all staff, patient, and family/Monitor gait and stability/Provide visual cue: red socks, yellow wristband, yellow gown, etc/Reinforce activity limits and safety measures with patient and family/Bed in lowest position, wheels locked, appropriate side rails in place/Call bell, personal items and telephone in reach/Instruct patient to call for assistance before getting out of bed/chair/stretcher/Non-slip footwear applied when patient is off stretcher/[place] to call system/Physically safe environment - no spills, clutter or unnecessary equipment/Purposeful Proactive Rounding/Room/bathroom lighting operational, light cord in reach

## 2024-11-26 NOTE — CONSULT NOTE ADULT - ASSESSMENT
60 year old gentleman with a PMH of HLD and intellectual disability (non verbal at baseline) presents to the ED for after witnessed seizure like activity. Patient had a full workup 1 month ago that was negative, including several days of vEEG and was discharged without AED's. The episode semiology described with aid today, however, strongly suggestive of seizures.     Recommendation:  - Admit to [facility] 60 year old gentleman with a PMH of HLD and intellectual disability (non verbal at baseline) presents to the ED for after witnessed seizure like activity. Patient had a full workup 1 month ago that was negative, including several days of vEEG and was discharged without AED's. The episode semiology described with aid today, however, strongly suggestive of seizures.     Recommendation:  - Admit to [facility] EMU  - seizure precautions  - no ASM for now  - VEEG  - f/u epilepsy recs regarding ASM

## 2024-11-26 NOTE — CONSULT NOTE ADULT - SUBJECTIVE AND OBJECTIVE BOX
NEUROLOGY CONSULT    HPI:    59 year old gentleman with a PMH of HLD and intellectual disability presents to the ED for evaluation of altered mental status.  Patient was at adult  center today when he had a syncopal episode.  Patient was caught by staff members and lowered to the floor.  Patient did not fall, no head trauma.  Staff reports patient subsequently had generalized shaking lasting about 5 to 6 minutes.  On EMS arrival, patient confused.  Patient's aide at bedside denies history of seizure.  Back to baseline in ED. Patient without history of seizure. Etiology of symptoms may be due to convulsive syncope, or epileptiform cause.          MEDICATIONS  Home Medications:  aspirin 81 mg oral delayed release tablet: 1 tab(s) orally once a day (12 Oct 2024 20:58)  senna leaf extract oral tablet: 2 tab(s) orally once a day (at bedtime) As needed Constipation (18 Oct 2024 17:17)  simvastatin 20 mg oral tablet: 1 tab(s) orally once a day (at bedtime) (18 Apr 2018 09:25)  Vitamin D3 1000 intl units oral tablet: 1 tab(s) orally once a day (18 Apr 2018 09:25)    MEDICATIONS  (STANDING):    MEDICATIONS  (PRN):      FAMILY HISTORY:    SOCIAL HISTORY: negative for tobacco, alcohol, or ilicit drug use.    Allergies    No Known Allergies    Intolerances        GEN: NAD, pleasant, cooperative    NEURO:   MENTAL STATUS: AAOx3  LANG/SPEECH: Fluent, intact naming, repetition & comprehension  CRANIAL NERVES:  II: Pupils equal and reactive, no RAPD, normal visual field and fundus  III, IV, VI: EOM intact, no gaze preference or deviation  V: normal  VII: no facial asymmetry  VIII: normal hearing to speech  MOTOR: 5/5 in both upper and lower extremities  REFLEXES: 2/4 throughout, bilateral flexor plantars  SENSORY: Normal to touch, temperature & pin prick in all extremiteis  COORD: Normal finger to nose and heel to shin, no tremor, no dysmetria  Gait:   NIHSS: **** ASPECT Score: ***** ICH Score: ****** (GCS)    LABS:          Hemoglobin A1C:   Vitamin B12     CAPILLARY BLOOD GLUCOSE      POCT Blood Glucose.: 94 mg/dL (26 Nov 2024 15:35)              Microbiology:      RADIOLOGY, EKG AND ADDITIONAL TESTS: Reviewed.           NEUROLOGY CONSULT    HPI:    60 year old gentleman with a PMH of HLD and intellectual disability (non verbal at baseline) presents to the ED for after witnessed seizure like activity. Collateral obtained via aid who witnessed the episode. Patient was having lunch today when he suddendly became "stiff", he was helped down to the floor where he reportedly had a 1-2 min generalized shaking episode. Per aid for 5 mins after the episode the patient was not his normal self (he typically moves around and is facially very expressive). No tongue bitting or urinary incontinence. No recent fevers, diarrhea or medication changes. Of note patient was seen for similar episode 10/13, transferred to [facility], vEEG non epileptiform. TTE (-), orthostatics (-). In the ED patient was afebrile, BP WNL, no leukocytosis              MEDICATIONS  Home Medications:  aspirin 81 mg oral delayed release tablet: 1 tab(s) orally once a day (12 Oct 2024 20:58)  senna leaf extract oral tablet: 2 tab(s) orally once a day (at bedtime) As needed Constipation (18 Oct 2024 17:17)  simvastatin 20 mg oral tablet: 1 tab(s) orally once a day (at bedtime) (18 Apr 2018 09:25)  Vitamin D3 1000 intl units oral tablet: 1 tab(s) orally once a day (18 Apr 2018 09:25)    MEDICATIONS  (STANDING):    MEDICATIONS  (PRN):      FAMILY HISTORY:    SOCIAL HISTORY: negative for tobacco, alcohol, or ilicit drug use.    Allergies    No Known Allergies    Intolerances        Limited exam due to intellectual diability    NEURO:   MENTAL STATUS: Awake and alert  LANG/SPEECH: non verbal   CRANIAL NERVES:  II: Pupils equal and reactive,  III, IV, VI: EOM intact, no gaze preference or deviation  V: normal  VII: no facial asymmetry  MOTOR: spontaneous antigravity movement upper and lower extremities  REFLEXES: 2/4 throughout, bilateral flexor plantars    LABS:          Hemoglobin A1C:   Vitamin B12     CAPILLARY BLOOD GLUCOSE      POCT Blood Glucose.: 94 mg/dL (26 Nov 2024 15:35)              Microbiology:      RADIOLOGY, EKG AND ADDITIONAL TESTS: Reviewed.           NEUROLOGY CONSULT    HPI:    60 year old gentleman with a PMH of HLD and intellectual disability (non verbal at baseline) presents to the ED for after witnessed seizure like activity. Collateral obtained via aid who witnessed the episode. Patient was having lunch today when he suddendly became "stiff" with his arms up in a 90 degree angle, he was helped down to the floor where he reportedly had a 1-2 min generalized shaking episode. Per aid for 5 mins after the episode the patient was not his normal self (he typically moves around and is facially very expressive). No tongue bitting or urinary incontinence. No recent fevers, diarrhea or medication changes. Of note patient was seen for similar episode 10/13, transferred to [facility], vEEG non epileptiform. TTE (-), orthostatics (-). In the ED patient was afebrile, BP WNL, no leukocytosis              MEDICATIONS  Home Medications:  aspirin 81 mg oral delayed release tablet: 1 tab(s) orally once a day (12 Oct 2024 20:58)  senna leaf extract oral tablet: 2 tab(s) orally once a day (at bedtime) As needed Constipation (18 Oct 2024 17:17)  simvastatin 20 mg oral tablet: 1 tab(s) orally once a day (at bedtime) (18 Apr 2018 09:25)  Vitamin D3 1000 intl units oral tablet: 1 tab(s) orally once a day (18 Apr 2018 09:25)    MEDICATIONS  (STANDING):    MEDICATIONS  (PRN):      FAMILY HISTORY:    SOCIAL HISTORY: negative for tobacco, alcohol, or ilicit drug use.    Allergies    No Known Allergies    Intolerances        Limited exam due to intellectual diability    NEURO:   MENTAL STATUS: Awake and alert  LANG/SPEECH: non verbal   CRANIAL NERVES:  II: Pupils equal and reactive,  III, IV, VI: EOM intact, no gaze preference or deviation  V: normal  VII: no facial asymmetry  MOTOR: spontaneous antigravity movement upper and lower extremities  REFLEXES: 2/4 throughout, bilateral flexor plantars    LABS:          Hemoglobin A1C:   Vitamin B12     CAPILLARY BLOOD GLUCOSE      POCT Blood Glucose.: 94 mg/dL (26 Nov 2024 15:35)              Microbiology:      RADIOLOGY, EKG AND ADDITIONAL TESTS: Reviewed.           NEUROLOGY CONSULT    HPI:    60 year old gentleman with a PMH of HLD and intellectual disability (non verbal at baseline) presents to the ED for after witnessed seizure like activity. Collateral obtained via aid who witnessed the episode. Patient was having lunch today when he suddendly became "stiff" with his arms up in a 90 degree angle, he was helped down to the floor where he reportedly had a 1-2 min generalized shaking episode. Per aid for 5 mins after the episode the patient was not his normal self (he typically moves around and is facially very expressive). No tongue bitting or urinary incontinence. No recent fevers, diarrhea or medication changes. Of note patient was seen for similar episode 10/13, transferred to [facility], vEEG non epileptiform. TTE (-), orthostatics (-). In the ED patient was afebrile, BP WNL, no leukocytosis.  Follows with outside neurologist as outpt and had MRI Brain in July 2024 with evidence of chronic infarcts and bilateral hippocampal atrophy.      MEDICATIONS  Home Medications:  aspirin 81 mg oral delayed release tablet: 1 tab(s) orally once a day (12 Oct 2024 20:58)  senna leaf extract oral tablet: 2 tab(s) orally once a day (at bedtime) As needed Constipation (18 Oct 2024 17:17)  simvastatin 20 mg oral tablet: 1 tab(s) orally once a day (at bedtime) (18 Apr 2018 09:25)  Vitamin D3 1000 intl units oral tablet: 1 tab(s) orally once a day (18 Apr 2018 09:25)    MEDICATIONS  (STANDING):    MEDICATIONS  (PRN):      FAMILY HISTORY:    SOCIAL HISTORY: negative for tobacco, alcohol, or ilicit drug use.    Allergies    No Known Allergies    Intolerances        Limited exam due to intellectual diability    NEURO:   MENTAL STATUS: Awake and alert  LANG/SPEECH: non verbal   CRANIAL NERVES:  II: Pupils equal and reactive,  III, IV, VI: EOM intact, no gaze preference or deviation  V: normal  VII: no facial asymmetry  MOTOR: spontaneous antigravity movement upper and lower extremities  REFLEXES: 2/4 throughout, bilateral flexor plantars    LABS:          Hemoglobin A1C:   Vitamin B12     CAPILLARY BLOOD GLUCOSE      POCT Blood Glucose.: 94 mg/dL (26 Nov 2024 15:35)              Microbiology:      RADIOLOGY, EKG AND ADDITIONAL TESTS: Reviewed.

## 2024-11-26 NOTE — ED PROVIDER NOTE - CLINICAL SUMMARY MEDICAL DECISION MAKING FREE TEXT BOX
59-year-old man with h/o intellectual disability, HLD, nonverbal at baseline, sent to ER from living facility for evaluation of seizure-like activity.  Per aide, patient was sitting at lunch table, developed stiffness and shaking to arms and upper body > noted immediately by staff and patient aided onto ground (did not fall or hit head), shaking lasted ~ 1-2 minutes, after ~5 more minutes patient back to baseline mental status.  No recent injury or illness.  Patient was previously admitted to [facility] last month for seizure-like activity > had negative head CT at that time, vEEG showed diffuse slowing and no seizure activity, patient was DC'd back to living facility, not started on any AEDs.  PE - nad, nc/at, eomi, perrl, op - clear, mmm, neck supple, cta b/l, no w/r/r, rrr, abd- soft, nt/nd, nabs, from x 4, no LE swelling/tenderness, awake and alert, follows commands, moves all extremities  -Labs reviewed and unremarkable.  Patient seen evaluated by neurology, recommend admission to EMU at [facility] for further eval 59-year-old man with h/o intellectual disability, HLD, nonverbal at baseline, sent to ER from living facility for evaluation of seizure-like activity.  Per aide, patient was sitting at lunch table, developed stiffness and shaking to arms and upper body > noted immediately by staff and patient aided onto ground (did not fall or hit head), shaking lasted ~ 1-2 minutes, after ~5 more minutes patient back to baseline mental status.  No recent injury or illness.  Patient was previously admitted to [facility] last month for seizure-like activity > had negative head CT at that time, vEEG showed diffuse slowing and no seizure activity, patient was DC'd back to living facility, not started on any AEDs.  PE - nad, nc/at, eomi, perrl, op - clear, mmm, neck supple, cta b/l, no w/r/r, rrr, abd- soft, nt/nd, nabs, from x 4, no LE swelling/tenderness, awake and alert, follows commands, moves all extremities  -Labs reviewed and unremarkable.  CT head: neg. Patient seen evaluated by neurology, recommend admission to EMU at [facility] for further eval

## 2024-11-27 DIAGNOSIS — I63.9 CEREBRAL INFARCTION, UNSPECIFIED: ICD-10-CM

## 2024-11-27 LAB
ALBUMIN SERPL ELPH-MCNC: 4 G/DL — SIGNIFICANT CHANGE UP (ref 3.5–5.2)
ALP SERPL-CCNC: 110 U/L — SIGNIFICANT CHANGE UP (ref 30–115)
ALT FLD-CCNC: 28 U/L — SIGNIFICANT CHANGE UP (ref 0–41)
ANION GAP SERPL CALC-SCNC: 7 MMOL/L — SIGNIFICANT CHANGE UP (ref 7–14)
AST SERPL-CCNC: 30 U/L — SIGNIFICANT CHANGE UP (ref 0–41)
BASOPHILS # BLD AUTO: 0.11 K/UL — SIGNIFICANT CHANGE UP (ref 0–0.2)
BASOPHILS NFR BLD AUTO: 1.2 % — HIGH (ref 0–1)
BILIRUB SERPL-MCNC: 1 MG/DL — SIGNIFICANT CHANGE UP (ref 0.2–1.2)
BUN SERPL-MCNC: 12 MG/DL — SIGNIFICANT CHANGE UP (ref 10–20)
CALCIUM SERPL-MCNC: 9 MG/DL — SIGNIFICANT CHANGE UP (ref 8.4–10.5)
CHLORIDE SERPL-SCNC: 106 MMOL/L — SIGNIFICANT CHANGE UP (ref 98–110)
CO2 SERPL-SCNC: 28 MMOL/L — SIGNIFICANT CHANGE UP (ref 17–32)
CREAT SERPL-MCNC: 1.3 MG/DL — SIGNIFICANT CHANGE UP (ref 0.7–1.5)
EGFR: 64 ML/MIN/1.73M2 — SIGNIFICANT CHANGE UP
EOSINOPHIL # BLD AUTO: 0.16 K/UL — SIGNIFICANT CHANGE UP (ref 0–0.7)
EOSINOPHIL NFR BLD AUTO: 1.8 % — SIGNIFICANT CHANGE UP (ref 0–8)
GLUCOSE SERPL-MCNC: 121 MG/DL — HIGH (ref 70–99)
HCT VFR BLD CALC: 45.1 % — SIGNIFICANT CHANGE UP (ref 42–52)
HGB BLD-MCNC: 15.3 G/DL — SIGNIFICANT CHANGE UP (ref 14–18)
IMM GRANULOCYTES NFR BLD AUTO: 0.7 % — HIGH (ref 0.1–0.3)
LYMPHOCYTES # BLD AUTO: 1.25 K/UL — SIGNIFICANT CHANGE UP (ref 1.2–3.4)
LYMPHOCYTES # BLD AUTO: 14.1 % — LOW (ref 20.5–51.1)
MAGNESIUM SERPL-MCNC: 2.2 MG/DL — SIGNIFICANT CHANGE UP (ref 1.8–2.4)
MCHC RBC-ENTMCNC: 31.5 PG — HIGH (ref 27–31)
MCHC RBC-ENTMCNC: 33.9 G/DL — SIGNIFICANT CHANGE UP (ref 32–37)
MCV RBC AUTO: 93 FL — SIGNIFICANT CHANGE UP (ref 80–94)
MONOCYTES # BLD AUTO: 0.66 K/UL — HIGH (ref 0.1–0.6)
MONOCYTES NFR BLD AUTO: 7.4 % — SIGNIFICANT CHANGE UP (ref 1.7–9.3)
NEUTROPHILS # BLD AUTO: 6.62 K/UL — HIGH (ref 1.4–6.5)
NEUTROPHILS NFR BLD AUTO: 74.8 % — SIGNIFICANT CHANGE UP (ref 42.2–75.2)
NRBC # BLD: 0 /100 WBCS — SIGNIFICANT CHANGE UP (ref 0–0)
PLATELET # BLD AUTO: 239 K/UL — SIGNIFICANT CHANGE UP (ref 130–400)
PMV BLD: 9.8 FL — SIGNIFICANT CHANGE UP (ref 7.4–10.4)
POTASSIUM SERPL-MCNC: 4.6 MMOL/L — SIGNIFICANT CHANGE UP (ref 3.5–5)
POTASSIUM SERPL-SCNC: 4.6 MMOL/L — SIGNIFICANT CHANGE UP (ref 3.5–5)
PROT SERPL-MCNC: 7.5 G/DL — SIGNIFICANT CHANGE UP (ref 6–8)
RBC # BLD: 4.85 M/UL — SIGNIFICANT CHANGE UP (ref 4.7–6.1)
RBC # FLD: 14.4 % — SIGNIFICANT CHANGE UP (ref 11.5–14.5)
SODIUM SERPL-SCNC: 141 MMOL/L — SIGNIFICANT CHANGE UP (ref 135–146)
WBC # BLD: 8.86 K/UL — SIGNIFICANT CHANGE UP (ref 4.8–10.8)
WBC # FLD AUTO: 8.86 K/UL — SIGNIFICANT CHANGE UP (ref 4.8–10.8)

## 2024-11-27 PROCEDURE — 85025 COMPLETE CBC W/AUTO DIFF WBC: CPT

## 2024-11-27 PROCEDURE — 99222 1ST HOSP IP/OBS MODERATE 55: CPT

## 2024-11-27 PROCEDURE — 95720 EEG PHY/QHP EA INCR W/VEEG: CPT

## 2024-11-27 PROCEDURE — 80048 BASIC METABOLIC PNL TOTAL CA: CPT

## 2024-11-27 PROCEDURE — 85027 COMPLETE CBC AUTOMATED: CPT

## 2024-11-27 PROCEDURE — 36415 COLL VENOUS BLD VENIPUNCTURE: CPT

## 2024-11-27 PROCEDURE — 83735 ASSAY OF MAGNESIUM: CPT

## 2024-11-27 PROCEDURE — 95716 VEEG EA 12-26HR CONT MNTR: CPT

## 2024-11-27 PROCEDURE — 95700 EEG CONT REC W/VID EEG TECH: CPT

## 2024-11-27 PROCEDURE — 80053 COMPREHEN METABOLIC PANEL: CPT

## 2024-11-27 RX ORDER — LORAZEPAM 2 MG/1
2 TABLET ORAL THREE TIMES A DAY
Refills: 0 | Status: DISCONTINUED | OUTPATIENT
Start: 2024-11-27 | End: 2024-11-29

## 2024-11-27 RX ADMIN — Medication 10 MILLIGRAM(S): at 21:31

## 2024-11-27 RX ADMIN — Medication 1000 UNIT(S): at 09:45

## 2024-11-27 RX ADMIN — Medication 5000 UNIT(S): at 05:16

## 2024-11-27 RX ADMIN — Medication 10 MILLIGRAM(S): at 00:01

## 2024-11-27 RX ADMIN — Medication 5000 UNIT(S): at 00:01

## 2024-11-27 RX ADMIN — Medication 81 MILLIGRAM(S): at 00:01

## 2024-11-27 RX ADMIN — Medication 1000 UNIT(S): at 00:01

## 2024-11-27 RX ADMIN — Medication 81 MILLIGRAM(S): at 09:45

## 2024-11-27 NOTE — PATIENT PROFILE ADULT - FALL HARM RISK - HARM RISK INTERVENTIONS

## 2024-11-27 NOTE — PATIENT PROFILE ADULT - FUNCTIONAL ASSESSMENT - BASIC MOBILITY 6.
1-calculated by average/Not able to assess (calculate score using [facility] averaging method)

## 2024-11-27 NOTE — H&P ADULT - NSHPLABSRESULTS_GEN_ALL_CORE
LABS/RADIOLOGY RESULTS:                          15.0   9.49  )-----------( 260      ( 26 Nov 2024 17:37 )             43.9   11-26    138  |  104  |  15  ----------------------------<  107[H]  4.1   |  26  |  1.3    Ca    9.1      26 Nov 2024 17:37  Mg     2.3     11-26    TPro  6.8  /  Alb  3.9  /  TBili  0.6  /  DBili  x   /  AST  21  /  ALT  20  /  AlkPhos  103  11-26  Blood Cultures    Urinalysis Basic - ( 26 Nov 2024 17:37 )    Color:  / Appearance:  / SG:  / pH:   Gluc: 107 mg/dL / Ketone:   / Bili:  / Urobili:    Blood:  / Protein:  / Nitrite:    Leuk Esterase:  / RBC:  / WBC    Sq Epi:  / Non Sq Epi:  / Bacteria:     IMPRESSION:    No acute intracranial pathology within the limitation of streak artifacts.    Stable mild chronic microvascular ischemic changes.    --- End of Report --- LABS/RADIOLOGY RESULTS:                          15.0   9.49  )-----------( 260      ( 26 Nov 2024 17:37 )             43.9   11-26    138  |  104  |  15  ----------------------------<  107[H]  4.1   |  26  |  1.3    Ca    9.1      26 Nov 2024 17:37  Mg     2.3     11-26    TPro  6.8  /  Alb  3.9  /  TBili  0.6  /  DBili  x   /  AST  21  /  ALT  20  /  AlkPhos  103  11-26  Blood Cultures        Color:  / Appearance:  / SG:  / pH:   Gluc: 107 mg/dL / Ketone:   / Bili:  / Urobili:    Blood:  / Protein:  / Nitrite:    Leuk Esterase:  / RBC:  / WBC    Sq Epi:  / Non Sq Epi:  / Bacteria:     IMPRESSION:    No acute intracranial pathology within the limitation of streak artifacts.    Stable mild chronic microvascular ischemic changes.    --- End of Report ---

## 2024-11-27 NOTE — H&P ADULT - HISTORY OF PRESENT ILLNESS
59-year-old male with a past medical history of hyperlipidemia and intellectual disability down sydrome presents to the ED for suspected seizure.  At around 12:25 PM today patient was having lunch at a table when he became stiff with his arms up and then was helped to the floor lateral recumbent position when he started shaking, the entire episode lasted about 1 to 2-minute.  Per health aide patient was in a fetal position medially after episode with neck bent to side, not his baseline, but was back to baseline self in approximately 5 to 10 minutes, however is nonverbal at baseline.  Per aid patient was layed down slowly onto the ground and kept on his side, he did not fall.  Patient is able to follow commands 59-year-old male with a past medical history of hyperlipidemia and intellectual disability down syndrome presents to the ED for suspected seizure.  At around 12:25 PM today patient was having lunch at a table when he became stiff with his arms up and then was helped to the floor lateral recumbent position when he started shaking, the entire episode lasted about 1 to 2-minute.  Per health aide patient was in a fetal position medially after episode with neck bent to side, not his baseline, but was back to baseline self in approximately 5 to 10 minutes, however is nonverbal at baseline.  Per aid patient was layed down slowly onto the ground and kept on his side, he did not fall.  Patient is able to follow commands.    ED vitals stable on admission, neuro consulted and planned to transfer to [facility] for EMA

## 2024-11-27 NOTE — H&P ADULT - NSHPPHYSICALEXAM_GEN_ALL_CORE
GENERAL: NAD, lying in bed comfortably  HEAD:  Atraumatic, normocephalic  NERVOUS SYSTEM:  nonverbal, moving all extremities, no focal deficits   EYES: EOMI, PERRL  NECK: Supple, trachea midline, no JVD  HEART: Regular rate and rhythm  LUNGS: Clear to auscultation bilaterally, no crackles, wheezing, or rhonchi  ABDOMEN: Soft, nontender, nondistended, +BS  EXTREMITIES: 2+ peripheral pulses bilaterally. No clubbing, cyanosis, or edema

## 2024-11-27 NOTE — H&P ADULT - ASSESSMENT
59-year-old male with a past medical history of hyperlipidemia and intellectual disability down sydrome presents to the ED for suspected seizure    Assessment    ·	Likely seizure episode at group home  ·	Intellectual disability  ·	HLD  ·	Presumed vitamin D deficiency    Plan    - seen by neuro, recommended to transfer to [facility] for EMU monitoring, ativan prn for seizures, patient is currently comfortable in bed with no complaints / ativan prn, no acute changes on CT head  - patient calm at the moment, aid at bedside  - statin  - vitamin d3    Pending: transfer to [facility] EMU, neuro follow up    # DVT PPX: heparin sc 59-year-old male with a past medical history of hyperlipidemia and intellectual disability down syndrome presents to the ED for suspected seizure. Similar episode in the past work up was negative.     Assessment    ·	Likely seizure episode at group home  ·	Intellectual disability  ·	HLD  ·	Presumed vitamin D deficiency    Plan    - seen by neuro, recommended to transfer to [facility] for EMU monitoring, ativan prn for seizures, patient is currently comfortable in bed with no complaints / ativan prn, no acute changes on CT head  - patient calm at the moment, aid at bedside  - statin  - vitamin d3    Pending: transfer to [facility] EMU, neuro follow up    # DVT PPX: heparin sc  # GI: no indicated  # activity as tolerated   dispo: [facility]   Pending: [facility], neuro followup   59-year-old male with a past medical history of hyperlipidemia and intellectual disability down syndrome presents to the ED for suspected seizure. Similar episode in the past work up was negative.     Assessment    ·	Likely seizure episode at group home  ·	Intellectual disability  ·	HLD  ·	Presumed vitamin D deficiency    Plan    - seen by neuro, recommended to transfer to [facility] for EMU monitoring, ativan prn for seizures, patient is currently comfortable in bed with no complaints / ativan prn, no acute changes on CT head  - patient calm at the moment, aid at bedside  - statin  - vitamin d3    Pending: transfer to [facility] EMU, neuro follow up    # DVT PPX: heparin sc  # GI: no indicated  # activity: bedrest  [place]   Pending: [facility], neuro followup

## 2024-11-28 LAB
ANION GAP SERPL CALC-SCNC: 9 MMOL/L — SIGNIFICANT CHANGE UP (ref 7–14)
BUN SERPL-MCNC: 14 MG/DL — SIGNIFICANT CHANGE UP (ref 10–20)
CALCIUM SERPL-MCNC: 9.1 MG/DL — SIGNIFICANT CHANGE UP (ref 8.4–10.5)
CHLORIDE SERPL-SCNC: 104 MMOL/L — SIGNIFICANT CHANGE UP (ref 98–110)
CO2 SERPL-SCNC: 25 MMOL/L — SIGNIFICANT CHANGE UP (ref 17–32)
CREAT SERPL-MCNC: 1.2 MG/DL — SIGNIFICANT CHANGE UP (ref 0.7–1.5)
EGFR: 70 ML/MIN/1.73M2 — SIGNIFICANT CHANGE UP
GLUCOSE SERPL-MCNC: 137 MG/DL — HIGH (ref 70–99)
HCT VFR BLD CALC: 46.7 % — SIGNIFICANT CHANGE UP (ref 42–52)
HGB BLD-MCNC: 15.9 G/DL — SIGNIFICANT CHANGE UP (ref 14–18)
MCHC RBC-ENTMCNC: 31.9 PG — HIGH (ref 27–31)
MCHC RBC-ENTMCNC: 34 G/DL — SIGNIFICANT CHANGE UP (ref 32–37)
MCV RBC AUTO: 93.6 FL — SIGNIFICANT CHANGE UP (ref 80–94)
NRBC # BLD: 0 /100 WBCS — SIGNIFICANT CHANGE UP (ref 0–0)
PLATELET # BLD AUTO: 260 K/UL — SIGNIFICANT CHANGE UP (ref 130–400)
PMV BLD: 8.9 FL — SIGNIFICANT CHANGE UP (ref 7.4–10.4)
POTASSIUM SERPL-MCNC: 4.6 MMOL/L — SIGNIFICANT CHANGE UP (ref 3.5–5)
POTASSIUM SERPL-SCNC: 4.6 MMOL/L — SIGNIFICANT CHANGE UP (ref 3.5–5)
RBC # BLD: 4.99 M/UL — SIGNIFICANT CHANGE UP (ref 4.7–6.1)
RBC # FLD: 14.2 % — SIGNIFICANT CHANGE UP (ref 11.5–14.5)
SODIUM SERPL-SCNC: 138 MMOL/L — SIGNIFICANT CHANGE UP (ref 135–146)
WBC # BLD: 8.97 K/UL — SIGNIFICANT CHANGE UP (ref 4.8–10.8)
WBC # FLD AUTO: 8.97 K/UL — SIGNIFICANT CHANGE UP (ref 4.8–10.8)

## 2024-11-28 PROCEDURE — 95720 EEG PHY/QHP EA INCR W/VEEG: CPT

## 2024-11-28 PROCEDURE — 99232 SBSQ HOSP IP/OBS MODERATE 35: CPT

## 2024-11-28 RX ORDER — LORAZEPAM 2 MG/1
1 TABLET ORAL ONCE
Refills: 0 | Status: DISCONTINUED | OUTPATIENT
Start: 2024-11-28 | End: 2024-11-29

## 2024-11-28 RX ORDER — HYDROXYZINE HYDROCHLORIDE 25 MG/1
25 TABLET, FILM COATED ORAL EVERY 6 HOURS
Refills: 0 | Status: DISCONTINUED | OUTPATIENT
Start: 2024-11-28 | End: 2024-11-29

## 2024-11-28 RX ADMIN — HYDROXYZINE HYDROCHLORIDE 25 MILLIGRAM(S): 25 TABLET, FILM COATED ORAL at 23:39

## 2024-11-28 RX ADMIN — Medication 5000 UNIT(S): at 06:07

## 2024-11-28 RX ADMIN — Medication 5000 UNIT(S): at 17:33

## 2024-11-28 RX ADMIN — Medication 1000 UNIT(S): at 09:41

## 2024-11-28 RX ADMIN — Medication 10 MILLIGRAM(S): at 21:45

## 2024-11-28 RX ADMIN — Medication 81 MILLIGRAM(S): at 09:41

## 2024-11-28 NOTE — EEG REPORT - NS EEG TEXT BOX
[facility] Department of Neurology         Inpatient Continuous video-Electroencephalogram    Patient Name:	PAM PIERRE    :	1965  MRN:	-    Study Start Date/Time:	2024, 10:39:51 AM  Study End Date/Time:    Referred by:        Brief Clinical History:  PAM PIERRE is a 59 year old Male; study performed to investigate for seizures or markers of epilepsy.   Technologist notes: -  Diagnosis Code:  R56.9 convulsions/seizure    Pertinent Medication:  n/a    Acquisition Details:  Electroencephalography was acquired using a minimum of 21 channels on an [email or web address] Neurology system v 9.3.1 with electrode placement according to the standard International 10-20 system following ACNS (American Clinical Neurophysiology Society) guidelines.  Anterior temporal T1 and T2 electrodes were utilized whenever possible.  The [employer] automated spike & seizure detections were all reviewed in detail, in addition to the entire raw EEG.    Findings:  Day 1:  2024, 10:39:51 AM to next morning at 07:00 AM   Background:  continuous, with predominantly theta frequencies.  Generalized Slowing:  None  Symmetry/Focality: No persistent asymmetries of voltage or frequency.     Voltage:  Normal (20+ uV)  Organization:  Appropriate anterior-posterior gradient  Posterior Dominant Rhythm:  No clear PDR   Sleep:  Absent.  Variability:   Yes		Reactivity:  Yes    Spontaneous Activity:  No epileptiform discharges   Events:  •	No electrographic seizures or significant clinical events occurred during this study.  Provocations:  •	Hyperventilation: was not performed.  •	Photic stimulation: was not performed.  Daily Summary:    •	There was continuous,  diffuse slowing of electrographic activity  •	There were no findings of active epilepsy, however this alone does not rule out the diagnosis.         Celsa Alatorre MD  Attending Neurologist, [facility]ilepsy [facility]

## 2024-11-28 NOTE — PROGRESS NOTE ADULT - ASSESSMENT
ASSESSMENT & PLAN:  Patient is a     PAST MEDICAL & SURGICAL HISTORY:  Hyperlipemia      Mitral regurgitation      Moderate developmental delay      Dementia      No significant past surgical history          #Misc  - DVT Prophylaxis:  - GI Prophylaxis:  - Diet:  - Activity:  - IV Fluids:  - Code Status:    Dispo: Mr Conley is a 59 YOM w a PMH of ID, trisomy 21, dementia, functional bedbound BIBEMS for weankess and "seizure like activity"  movements.     #Weakness  #reported seizure like activity  STROKE WAS RULED OUT BY IMAGING  -possible seizure episode at group home  -eeg negative as above  -no seizure like activity since admission  -c/w PRN ativan for seizure  -f/u neuro consult regarding anti seizure medication   -patient anticipated for 24hrs pending neuro       # DVT PPX: heparin sc  # GI: no indicated  # activity: bedrest  dispo: [facility]               #Misc  - DVT Prophylaxis:  - GI Prophylaxis:  - Diet:  - Activity:  - IV Fluids:  - Code Status:    Dispo:

## 2024-11-28 NOTE — PROGRESS NOTE ADULT - SUBJECTIVE AND OBJECTIVE BOX
PAM PIERRE 59y Male  MRN#: [id number]   Hospital Day: 1d    SUBJECTIVE  Patient is a 59y old Male who presents with a chief complaint of seizure (27 Nov 2024 00:55)  Currently admitted to medicine with the primary diagnosis of Seizure      INTERVAL HPI AND OVERNIGHT EVENTS:  Patient was examined and seen at bedside. This morning he is resting comfortably in bed and reports no issues or overnight events.    REVIEW OF SYMPTOMS:  CONSTITUTIONAL: No weakness, fevers or chills; No headaches  EYES: No visual changes, eye pain, or discharge  ENT: No vertigo; No ear pain or change in hearing; No sore throat or difficulty swallowing  NECK: No pain or stiffness  RESPIRATORY: No cough, wheezing, or hemoptysis; No shortness of breath  CARDIOVASCULAR: No chest pain or palpitations  GASTROINTESTINAL: No abdominal or epigastric pain; No nausea, vomiting, or hematemesis; No diarrhea or constipation; No melena or hematochezia  GENITOURINARY: No dysuria, frequency or hematuria  MUSCULOSKELETAL: No joint pain, no muscle pain, no weakness  NEUROLOGICAL: No numbness or weakness  SKIN: No itching or rashes    OBJECTIVE  PAST MEDICAL & SURGICAL HISTORY  Hyperlipemia    Mitral regurgitation    Moderate developmental delay    Dementia    No significant past surgical history      ALLERGIES:  No Known Allergies    MEDICATIONS:  STANDING MEDICATIONS  aspirin  chewable 81 milliGRAM(s) Oral daily  atorvastatin 10 milliGRAM(s) Oral at bedtime  cholecalciferol 1000 Unit(s) Oral daily  heparin   Injectable 5000 Unit(s) SubCutaneous every 12 hours    PRN MEDICATIONS  LORazepam   Injectable 2 milliGRAM(s) IV Push three times a day PRN      VITAL SIGNS: Last 24 Hours  T(C): 36.1 (27 Nov 2024 21:39), Max: 36.4 (27 Nov 2024 14:29)  T(F): 97 (27 Nov 2024 21:39), Max: 97.5 (27 Nov 2024 14:29)  HR: 86 (28 Nov 2024 06:18) (81 - 92)  BP: 130/89 (28 Nov 2024 06:18) (106/62 - 134/80)  BP(mean): --  RR: 16 (28 Nov 2024 06:18) (16 - 18)  SpO2: 97% (28 Nov 2024 06:18) (96% - 97%)    LABS:                        15.3   8.86  )-----------( 239      ( 27 Nov 2024 09:00 )             45.1     11-27    141  |  106  |  12  ----------------------------<  121[H]  4.6   |  28  |  1.3    Ca    9.0      27 Nov 2024 09:00  Mg     2.2     11-27    TPro  7.5  /  Alb  4.0  /  TBili  1.0  /  DBili  x   /  AST  30  /  ALT  28  /  AlkPhos  110  11-27      Urinalysis Basic - ( 27 Nov 2024 09:00 )    Color: x / Appearance: x / SG: x / pH: x  Gluc: 121 mg/dL / Ketone: x  / Bili: x / Urobili: x   Blood: x / Protein: x / Nitrite: x   Leuk Esterase: x / RBC: x / WBC x   Sq Epi: x / Non Sq Epi: x / Bacteria: x                RADIOLOGY:      PHYSICAL EXAM:  CONSTITUTIONAL: No acute distress, well-developed, well-groomed, AAOx3  HEAD: Atraumatic, normocephalic  EYES: EOM intact, PERRLA, conjunctiva and sclera clear  ENT: Supple, no masses, no thyromegaly, no bruits, no JVD; moist mucous membranes  PULMONARY: Clear to auscultation bilaterally; no wheezes, rales, or rhonchi  CARDIOVASCULAR: Regular rate and rhythm; no murmurs, rubs, or gallops  GASTROINTESTINAL: Soft, non-tender, non-distended; bowel sounds present  MUSCULOSKELETAL: 2+ peripheral pulses; no clubbing, no cyanosis, no edema  NEUROLOGY: non-focal  SKIN: No rashes or lesions; warm and dry     PAM PIERRE 59y Male  MRN#: [id number]   Hospital Day: 1d    SUBJECTIVE  Patient is a 59y old Male who presents with a chief complaint of seizure (27 Nov 2024 00:55)  Currently admitted to medicine with the primary diagnosis of Seizure      INTERVAL HPI AND OVERNIGHT EVENTS:  Patient was examined and seen at bedside. This morning he is resting comfortably in bed, drinking apple juice with a straw     REVIEW OF SYMPTOMS:  non verbal unable to assess at this time     OBJECTIVE  PAST MEDICAL & SURGICAL HISTORY  Hyperlipemia    Mitral regurgitation    Moderate developmental delay    Dementia    No significant past surgical history      ALLERGIES:  No Known Allergies    MEDICATIONS:  STANDING MEDICATIONS  aspirin  chewable 81 milliGRAM(s) Oral daily  atorvastatin 10 milliGRAM(s) Oral at bedtime  cholecalciferol 1000 Unit(s) Oral daily  heparin   Injectable 5000 Unit(s) SubCutaneous every 12 hours    PRN MEDICATIONS  LORazepam   Injectable 2 milliGRAM(s) IV Push three times a day PRN      VITAL SIGNS: Last 24 Hours  T(C): 36.1 (27 Nov 2024 21:39), Max: 36.4 (27 Nov 2024 14:29)  T(F): 97 (27 Nov 2024 21:39), Max: 97.5 (27 Nov 2024 14:29)  HR: 86 (28 Nov 2024 06:18) (81 - 92)  BP: 130/89 (28 Nov 2024 06:18) (106/62 - 134/80)  BP(mean): --  RR: 16 (28 Nov 2024 06:18) (16 - 18)  SpO2: 97% (28 Nov 2024 06:18) (96% - 97%)    LABS:                        15.3   8.86  )-----------( 239      ( 27 Nov 2024 09:00 )             45.1     11-27    141  |  106  |  12  ----------------------------<  121[H]  4.6   |  28  |  1.3    Ca    9.0      27 Nov 2024 09:00  Mg     2.2     11-27    TPro  7.5  /  Alb  4.0  /  TBili  1.0  /  DBili  x   /  AST  30  /  ALT  28  /  AlkPhos  110  11-27      Urinalysis Basic - ( 27 Nov 2024 09:00 )    Color: x / Appearance: x / SG: x / pH: x  Gluc: 121 mg/dL / Ketone: x  / Bili: x / Urobili: x   Blood: x / Protein: x / Nitrite: x   Leuk Esterase: x / RBC: x / WBC x   Sq Epi: x / Non Sq Epi: x / Bacteria: x      Findings:  Day 1:  11/27/2024, 10:39:51 AM to next morning at 07:00 AM   Background:  continuous, with predominantly theta frequencies.  Generalized Slowing:  None  Symmetry/Focality: No persistent asymmetries of voltage or frequency.     Voltage:  Normal (20+ uV)  Organization:  Appropriate anterior-posterior gradient  Posterior Dominant Rhythm:  No clear PDR   Sleep:  Absent.  Variability:   Yes		Reactivity:  Yes    Spontaneous Activity:  No epileptiform discharges   Events:  •	No electrographic seizures or significant clinical events occurred during this study.  Provocations:  •	Hyperventilation: was not performed.  •	Photic stimulation: was not performed.  Daily Summary:    •	There was continuous,  diffuse slowing of electrographic activity  •	There were no findings of active epilepsy, however this alone does not rule out the diagnosis.           RADIOLOGY:      PHYSICAL EXAM:  CONSTITUTIONAL: No acute distress, well-groomed, propped up in bed drinking   HEAD: Atraumatic, normocephalic  EYES: PERRLA, conjunctiva and sclera clear  ENT: Supple, no masses, no thyromegaly, no bruits, no JVD; moist mucous membranes  PULMONARY: Clear to auscultation bilaterally; no wheezes, rales, or rhonchi  CARDIOVASCULAR: Regular rate and rhythm; no murmurs, rubs, or gallops  GASTROINTESTINAL: Soft, non-tender, non-distended; bowel sounds present  MUSCULOSKELETAL: 2+ peripheral pulses; no clubbing, no cyanosis  NEUROLOGY: non verbal   SKIN: No rashes or lesions; warm and dry

## 2024-11-29 ENCOUNTER — TRANSCRIPTION ENCOUNTER (OUTPATIENT)
Age: 59
End: 2024-11-29

## 2024-11-29 VITALS
SYSTOLIC BLOOD PRESSURE: 107 MMHG | TEMPERATURE: 98 F | RESPIRATION RATE: 16 BRPM | OXYGEN SATURATION: 98 % | HEART RATE: 78 BPM | DIASTOLIC BLOOD PRESSURE: 61 MMHG

## 2024-11-29 PROCEDURE — 99239 HOSP IP/OBS DSCHRG MGMT >30: CPT

## 2024-11-29 RX ORDER — PYRIDOXINE HCL (VITAMIN B6) 25 MG
1 TABLET ORAL
Qty: 0 | Refills: 0 | DISCHARGE
Start: 2024-11-29

## 2024-11-29 RX ORDER — LEVETIRACETAM 1000 MG/1
1 TABLET ORAL
Qty: 60 | Refills: 3
Start: 2024-11-29 | End: 2025-03-28

## 2024-11-29 RX ORDER — PYRIDOXINE HCL (VITAMIN B6) 25 MG
50 TABLET ORAL DAILY
Refills: 0 | Status: DISCONTINUED | OUTPATIENT
Start: 2024-11-29 | End: 2024-11-29

## 2024-11-29 RX ORDER — LEVETIRACETAM 1000 MG/1
250 TABLET ORAL EVERY 12 HOURS
Refills: 0 | Status: DISCONTINUED | OUTPATIENT
Start: 2024-11-29 | End: 2024-11-29

## 2024-11-29 RX ORDER — PYRIDOXINE HCL (VITAMIN B6) 25 MG
1 TABLET ORAL
Refills: 0 | DISCHARGE

## 2024-11-29 RX ORDER — PYRIDOXINE HCL (VITAMIN B6) 25 MG
1 TABLET ORAL
Qty: 30 | Refills: 3
Start: 2024-11-29 | End: 2025-03-28

## 2024-11-29 RX ADMIN — Medication 1000 UNIT(S): at 11:43

## 2024-11-29 RX ADMIN — Medication 5000 UNIT(S): at 05:12

## 2024-11-29 RX ADMIN — Medication 50 MILLIGRAM(S): at 11:43

## 2024-11-29 RX ADMIN — LORAZEPAM 1 MILLIGRAM(S): 2 TABLET ORAL at 00:50

## 2024-11-29 RX ADMIN — Medication 81 MILLIGRAM(S): at 11:43

## 2024-11-29 RX ADMIN — LEVETIRACETAM 250 MILLIGRAM(S): 1000 TABLET ORAL at 11:43

## 2024-11-29 NOTE — PROGRESS NOTE ADULT - SUBJECTIVE AND OBJECTIVE BOX
PAM PIERRE 59y Male  MRN#: [id number]   Hospital Day: 2d    SUBJECTIVE  Patient is a 59y old Male who presents with a chief complaint of seizure (29 Nov 2024 11:20)  Currently admitted to medicine with the primary diagnosis of Seizure      INTERVAL HPI AND OVERNIGHT EVENTS:  Patient was examined and seen at bedside. This morning he is resting comfortably in bed and reports no issues or overnight events.    REVIEW OF SYMPTOMS:  CONSTITUTIONAL: No weakness, fevers or chills; No headaches  EYES: No visual changes, eye pain, or discharge  ENT: No vertigo; No ear pain or change in hearing; No sore throat or difficulty swallowing  NECK: No pain or stiffness  RESPIRATORY: No cough, wheezing, or hemoptysis; No shortness of breath  CARDIOVASCULAR: No chest pain or palpitations  GASTROINTESTINAL: No abdominal or epigastric pain; No nausea, vomiting, or hematemesis; No diarrhea or constipation; No melena or hematochezia  GENITOURINARY: No dysuria, frequency or hematuria  MUSCULOSKELETAL: No joint pain, no muscle pain, no weakness  NEUROLOGICAL: No numbness or weakness  SKIN: No itching or rashes    OBJECTIVE  PAST MEDICAL & SURGICAL HISTORY  Hyperlipemia    Mitral regurgitation    Moderate developmental delay    Dementia    No significant past surgical history      ALLERGIES:  No Known Allergies    MEDICATIONS:  STANDING MEDICATIONS  aspirin  chewable 81 milliGRAM(s) Oral daily  atorvastatin 10 milliGRAM(s) Oral at bedtime  cholecalciferol 1000 Unit(s) Oral daily  heparin   Injectable 5000 Unit(s) SubCutaneous every 12 hours  levETIRAcetam 250 milliGRAM(s) Oral every 12 hours  pyridoxine 50 milliGRAM(s) Oral daily    PRN MEDICATIONS  hydrOXYzine hydrochloride 25 milliGRAM(s) Oral every 6 hours PRN  LORazepam   Injectable 2 milliGRAM(s) IV Push three times a day PRN      VITAL SIGNS: Last 24 Hours  T(C): 36.3 (29 Nov 2024 06:26), Max: 36.7 (28 Nov 2024 13:25)  T(F): 97.4 (29 Nov 2024 06:26), Max: 98 (28 Nov 2024 13:25)  HR: 77 (29 Nov 2024 06:26) (65 - 86)  BP: 115/79 (29 Nov 2024 06:26) (99/63 - 115/79)  BP(mean): --  RR: 18 (29 Nov 2024 06:26) (16 - 18)  SpO2: 95% (28 Nov 2024 21:39) (95% - 95%)    LABS:                        15.9   8.97  )-----------( 260      ( 28 Nov 2024 09:16 )             46.7     11-28    138  |  104  |  14  ----------------------------<  137[H]  4.6   |  25  |  1.2    Ca    9.1      28 Nov 2024 09:16        Urinalysis Basic - ( 28 Nov 2024 09:16 )    Color: x / Appearance: x / SG: x / pH: x  Gluc: 137 mg/dL / Ketone: x  / Bili: x / Urobili: x   Blood: x / Protein: x / Nitrite: x   Leuk Esterase: x / RBC: x / WBC x   Sq Epi: x / Non Sq Epi: x / Bacteria: x                RADIOLOGY:      PHYSICAL EXAM:  CONSTITUTIONAL: No acute distress, propped up in bed, looking around the room   HEAD: Atraumatic, normocephalic  EYES: PERRLA, conjunctiva and sclera clear  ENT: Supple, no masses, no thyromegaly, no bruits, no JVD; moist mucous membranes  PULMONARY: Clear to auscultation bilaterally; no wheezes, rales, or rhonchi  CARDIOVASCULAR: Regular rate and rhythm; no murmurs, rubs, or gallops  GASTROINTESTINAL: Soft, non-tender, non-distended; bowel sounds present  MUSCULOSKELETAL: 2+ peripheral pulses; no clubbing, no cyanosis  NEUROLOGY: ho hyper/hypo refelxia, withdraws from tactile stimulus, no tremor   SKIN: No rashes or lesions; warm and dry

## 2024-11-29 NOTE — DISCHARGE NOTE PROVIDER - CARE PROVIDER_API CALL
Neurology,   Instructions given to group home by Neurology team  Phone: (   )    -  Fax: (   )    -  Follow Up Time:

## 2024-11-29 NOTE — DISCHARGE NOTE PROVIDER - DISCHARGE SERVICE FOR PATIENT
on the discharge service for the patient. I have reviewed and made amendments to the documentation where necessary.
sob

## 2024-11-29 NOTE — DISCHARGE NOTE PROVIDER - NSDCMRMEDTOKEN_GEN_ALL_CORE_FT
aspirin 81 mg oral delayed release tablet: 1 tab(s) orally once a day  Keppra 250 mg oral tablet: 1 tab(s) orally every 12 hours  pyridoxine 50 mg oral tablet: 1 tab(s) orally once a day  senna leaf extract oral tablet: 2 tab(s) orally once a day (at bedtime) As needed Constipation  simvastatin 20 mg oral tablet: 1 tab(s) orally once a day (at bedtime)  Vitamin B6 50 mg oral tablet: 1 tab(s) orally once a day  Vitamin D3 1000 intl units oral tablet: 1 tab(s) orally once a day

## 2024-11-29 NOTE — PROGRESS NOTE ADULT - ASSESSMENT
Mr Conley is a 59 YOM w a PMH of ID, trisomy 21, dementia, functional bedbound BIBEMS for weakness and "seizure like activity"  movements.     #Weakness  #reported seizure like activity  STROKE WAS RULED OUT BY IMAGING  -possible seizure episode at group home  -eeg negative as above  -no seizure like activity since admission  -c/w PRN ativan for seizure  -f/u neuro consult regarding anti seizure medication   -patient  medically stable for DC. New medications (keppra and vit d) started while in patient. CM, SW and author were in contact w group home who state intake team is not in until MONDAY and as such patient will have to stay until monday. Group home [facility]       # DVT PPX: heparin sc  # GI: no indicated  # activity: bedrest  dispo: south

## 2024-11-29 NOTE — DISCHARGE NOTE NURSING/CASE MANAGEMENT/SOCIAL WORK - PATIENT PORTAL LINK FT
You can access the FollowMyHealth Patient Portal offered by [facility] by registering at the following website: [email or web address]. By joining [employer]’s FollowMyHealth portal, you will also be able to view your health information using other applications (apps) compatible with our system.

## 2024-11-29 NOTE — EEG REPORT - NS EEG TEXT BOX
[facility] Department of Neurology         Inpatient Continuous video-Electroencephalogram    Patient Name:	PAM PIERRE    :	1965  MRN:	-    Study Start Date/Time:	2024, 10:39:51 AM  Study End Date/Time:        2024,  11:29 AM             Daily Updates (from 07:00 am until 07:00 am):  Day 2   :   Pertinent medications:   Background:  continuous, with predominantly theta frequencies.  Generalized Slowing:  Continuous semi-rhythmic theta   Symmetry/Focality: No persistent asymmetries of voltage or frequency.     Voltage:  Normal (20+ uV)  Organization:  Appropriate anterior-posterior gradient  Posterior Dominant Rhythm:  No clear PDR   Sleep:  Symmetric, synchronous spindles and K complexes.  Variability:   Yes		Reactivity:  Yes    Spontaneous Activity:  No epileptiform discharges   Events:  1)	No electrographic seizures or significant clinical events occurred during this study.  Provocations:  •	Hyperventilation: was not performed.  •	Photic stimulation: was not performed.  Daily Summary:    •	 There was continuous,  diffuse slowing of electrographic activity  •	There were no findings of active epilepsy, however this alone does not rule out the diagnosis.       Celsa Alatorre MD   Attending Neurologist, [facility]    FINAL Impression:  Abnormal Continuous/long-term video-EEG  •	There was continuous,  diffuse slowing of electrographic activit    Final Clinical Correlation:  1)	There were indicators of Diffuse or multifocal cerebral dysfunction.    Celsa Alatorre MD   Attending Neurologist, [facility]

## 2024-11-29 NOTE — PROGRESS NOTE ADULT - SUBJECTIVE AND OBJECTIVE BOX
Epilepsy NP Note:    VEEG monitoring completed, patient is clear for discharge from neurology standpoint.    Patient is a group [place] resident, being followed as outpatient by neurologist Dr. Kwabena Childress at [facility] on [facility], last seen November 21, 2024.     Group [facility] already scheduled an appointment at [facility] for February 18, 2025 at 1:30 pm for second opinion.    [address] [place] [address]  [phone]    Patient is recommended to start Keppra 250mg q12hrs and Vitamin B6 50mg daily.    Rx sent to the pharmacy.    CBC, CMP, Keppra level trough to be done prior to follow up.    Detailed written and verbal instructions regarding seizure precautions and follow up plan are given to the patient, all questions answered. Patient verbalized understanding.    Discussed with hospitalist and PA.     Epilepsy NP Note:    VEEG monitoring completed, patient is clear for discharge from neurology standpoint.    Patient is a group [place] resident, being followed as outpatient by neurologist Dr. Kwabena Childress at [facility] on [facility], last seen November 21, 2024.     [facility] already scheduled an appointment at [facility] for February 18, 2025 at 1:30 pm for second opinion.    [address] [place] [address]  [phone]    Patient is recommended to start Keppra 250mg q12hrs and Vitamin B6 50mg daily.    Rx sent to the pharmacy.    CBC, CMP, Keppra level trough to be done prior to follow up.    Detailed instructions regarding seizure precautions and follow up plan are given to the group home RN [facility] [phone]., all questions answered.     Discussed with , medical and nursing teams.     Epilepsy NP Note:    VEEG monitoring completed, patient is clear for discharge from neurology standpoint.    Patient is a group [place] resident, being followed as outpatient by neurologist Dr. Kwabena Childress at [facility] on [facility], last seen November 21, 2024.     [facility] already scheduled an appointment at [facility] for February 18, 2025 at 1:30 pm for second opinion.    [address] [place] [address]  [phone]    Patient is recommended to start Keppra 250mg q12hrs and Vitamin B6 50mg daily.    Rx sent to the [facility] pharmacy as requested by the [facility].    CBC, CMP, Keppra level trough to be done prior to follow up.    Detailed instructions regarding seizure precautions and follow up plan are given to the group home DEE Glaser [phone]., all questions answered.     Discussed with , medical and nursing teams.

## 2024-11-29 NOTE — DISCHARGE NOTE PROVIDER - HOSPITAL COURSE
Mr Jarvis is a 59 YOM w a PMH of ID, trisomy 21, dementia, functional bedbound BIBEMS for weakness and "seizure like activity"  movements.     #Weakness  #Reported seizure like activity    STROKE WAS RULED OUT BY IMAGING  -possible seizure episode at group home  -eeg negative as above: No electrographic seizures or significant clinical events occurred during this study.  -no seizure like activity since admission  -c/w PRN ativan for seizure  -neuro consult regarding anti seizure medication : started on Keppra 250mg PO Q12H upon discharge

## 2024-11-29 NOTE — DISCHARGE NOTE PROVIDER - NSDCCPCAREPLAN_GEN_ALL_CORE_FT
PRINCIPAL DISCHARGE DIAGNOSIS  Diagnosis: Seizure  Assessment and Plan of Treatment: Detailed instructions regarding seizure precautions and follow up plan were given to the group home RN Alfredito [phone]., all questions answered.   - patient was started on Keppra 250mg Po Q12H upon discharge

## 2024-11-29 NOTE — DISCHARGE NOTE PROVIDER - ATTENDING DISCHARGE PHYSICAL EXAMINATION:
CONSTITUTIONAL: No acute distress, propped up in bed, looking around the room   HEAD: Atraumatic, normocephalic  EYES: PERRLA, conjunctiva and sclera clear  ENT: Supple, no masses, no thyromegaly, no bruits, no JVD; moist mucous membranes  PULMONARY: Clear to auscultation bilaterally; no wheezes, rales, or rhonchi  CARDIOVASCULAR: Regular rate and rhythm; no murmurs, rubs, or gallops  GASTROINTESTINAL: Soft, non-tender, non-distended; bowel sounds present  MUSCULOSKELETAL: 2+ peripheral pulses; no clubbing, no cyanosis  NEUROLOGY: ho hyper/hypo refelxia, withdraws from tactile stimulus, no tremor   SKIN: No rashes or lesions; warm and dry

## 2024-11-29 NOTE — DISCHARGE NOTE NURSING/CASE MANAGEMENT/SOCIAL WORK - FINANCIAL ASSISTANCE
[facility] provides services at a reduced cost to those who are determined to be eligible through [facility]’s financial assistance program. Information regarding [facility]’s financial assistance program can be found by going to [email or web address] or by calling [phone].

## 2024-11-29 NOTE — DISCHARGE NOTE PROVIDER - NSDCFUSCHEDAPPT_GEN_ALL_CORE_FT
Stas Finley  [facility] PreAdmits  Scheduled Appointment: 02/18/2025    [facility] Physician [facility]  NEUROLOGY  Emir   Scheduled Appointment: 02/18/2025

## 2024-11-29 NOTE — DISCHARGE NOTE PROVIDER - PROVIDER TOKENS
FREE:[LAST:[Neurology],PHONE:[(   )    -],FAX:[(   )    -],ADDRESS:[Instructions given to group home by Neurology team]]

## 2024-12-09 DIAGNOSIS — Z74.01 BED CONFINEMENT STATUS: ICD-10-CM

## 2024-12-09 DIAGNOSIS — R56.9 UNSPECIFIED CONVULSIONS: ICD-10-CM

## 2024-12-09 DIAGNOSIS — R62.50 UNSPECIFIED LACK OF EXPECTED NORMAL PHYSIOLOGICAL DEVELOPMENT IN CHILDHOOD: ICD-10-CM

## 2024-12-09 DIAGNOSIS — E78.5 HYPERLIPIDEMIA, UNSPECIFIED: ICD-10-CM

## 2024-12-09 DIAGNOSIS — Q90.9 DOWN SYNDROME, UNSPECIFIED: ICD-10-CM

## 2024-12-09 DIAGNOSIS — I34.0 NONRHEUMATIC MITRAL (VALVE) INSUFFICIENCY: ICD-10-CM

## 2025-01-09 ENCOUNTER — OUTPATIENT (OUTPATIENT)
Dept: OUTPATIENT SERVICES | Facility: HOSPITAL | Age: 60
LOS: 1 days | End: 2025-01-09
Payer: COMMERCIAL

## 2025-01-09 DIAGNOSIS — R26.9 UNSPECIFIED ABNORMALITIES OF GAIT AND MOBILITY: ICD-10-CM

## 2025-01-09 PROCEDURE — 97112 NEUROMUSCULAR REEDUCATION: CPT | Mod: GP

## 2025-01-09 PROCEDURE — 97116 GAIT TRAINING THERAPY: CPT | Mod: GP

## 2025-01-10 DIAGNOSIS — R26.9 UNSPECIFIED ABNORMALITIES OF GAIT AND MOBILITY: ICD-10-CM

## 2025-01-15 DIAGNOSIS — M54.12 SPINAL STENOSIS, CERVICAL REGION: ICD-10-CM

## 2025-01-15 DIAGNOSIS — F03.90 UNSPECIFIED DEMENTIA W/OUT BEHAVIORAL DISTURBANCE: ICD-10-CM

## 2025-01-15 DIAGNOSIS — R13.10 DYSPHAGIA, UNSPECIFIED: ICD-10-CM

## 2025-01-15 DIAGNOSIS — M48.02 SPINAL STENOSIS, CERVICAL REGION: ICD-10-CM

## 2025-01-15 DIAGNOSIS — I34.0 NONRHEUMATIC MITRAL (VALVE) INSUFFICIENCY: ICD-10-CM

## 2025-01-15 DIAGNOSIS — R56.9 UNSPECIFIED CONVULSIONS: ICD-10-CM

## 2025-01-15 DIAGNOSIS — F70 MILD INTELLECTUAL DISABILITIES: ICD-10-CM

## 2025-01-16 ENCOUNTER — OUTPATIENT (OUTPATIENT)
Dept: OUTPATIENT SERVICES | Facility: HOSPITAL | Age: 60
LOS: 1 days | End: 2025-01-16
Payer: COMMERCIAL

## 2025-01-16 ENCOUNTER — OUTPATIENT (OUTPATIENT)
Dept: OUTPATIENT SERVICES | Facility: HOSPITAL | Age: 60
LOS: 1 days | End: 2025-01-16

## 2025-01-16 ENCOUNTER — APPOINTMENT (OUTPATIENT)
Dept: PEDIATRIC DEVELOPMENTAL SERVICES | Facility: CLINIC | Age: 60
End: 2025-01-16
Payer: COMMERCIAL

## 2025-01-16 VITALS
BODY MASS INDEX: 29.06 KG/M2 | OXYGEN SATURATION: 99 % | TEMPERATURE: 98.4 F | DIASTOLIC BLOOD PRESSURE: 83 MMHG | HEART RATE: 88 BPM | SYSTOLIC BLOOD PRESSURE: 127 MMHG | WEIGHT: 148 LBS | HEIGHT: 60 IN

## 2025-01-16 DIAGNOSIS — Z23 ENCOUNTER FOR IMMUNIZATION: ICD-10-CM

## 2025-01-16 DIAGNOSIS — R26.9 UNSPECIFIED ABNORMALITIES OF GAIT AND MOBILITY: ICD-10-CM

## 2025-01-16 DIAGNOSIS — E78.5 HYPERLIPIDEMIA, UNSPECIFIED: ICD-10-CM

## 2025-01-16 DIAGNOSIS — E55.9 VITAMIN D DEFICIENCY, UNSPECIFIED: ICD-10-CM

## 2025-01-16 DIAGNOSIS — Q90.9 DOWN SYNDROME, UNSPECIFIED: ICD-10-CM

## 2025-01-16 DIAGNOSIS — I35.1 NONRHEUMATIC AORTIC (VALVE) INSUFFICIENCY: ICD-10-CM

## 2025-01-16 DIAGNOSIS — Z00.00 ENCOUNTER FOR GENERAL ADULT MEDICAL EXAMINATION WITHOUT ABNORMAL FINDINGS: ICD-10-CM

## 2025-01-16 DIAGNOSIS — I63.9 CEREBRAL INFARCTION, UNSPECIFIED: ICD-10-CM

## 2025-01-16 PROCEDURE — 99214 OFFICE O/P EST MOD 30 MIN: CPT | Mod: GC

## 2025-01-16 PROCEDURE — 90471 IMMUNIZATION ADMIN: CPT

## 2025-01-16 PROCEDURE — 99214 OFFICE O/P EST MOD 30 MIN: CPT | Mod: 25

## 2025-01-16 PROCEDURE — 90472 IMMUNIZATION ADMIN EACH ADD: CPT

## 2025-01-16 RX ORDER — SIMVASTATIN 20 MG/1
20 TABLET, FILM COATED ORAL
Qty: 30 | Refills: 3 | Status: ACTIVE | COMMUNITY
Start: 1900-01-01 | End: 1900-01-01

## 2025-01-16 RX ORDER — GLUCOSAMINE/MSM/CHONDROIT SULF 500-166.6
10 TABLET ORAL
Qty: 30 | Refills: 3 | Status: ACTIVE | COMMUNITY
Start: 1900-01-01 | End: 1900-01-01

## 2025-01-16 RX ORDER — MULTIVIT-MIN/FOLIC/VIT K/LYCOP 400-300MCG
25 MCG TABLET ORAL
Qty: 30 | Refills: 3 | Status: ACTIVE | COMMUNITY
Start: 1900-01-01 | End: 1900-01-01

## 2025-01-16 RX ORDER — LEVETIRACETAM 250 MG/1
250 TABLET, FILM COATED ORAL TWICE DAILY
Refills: 0 | Status: ACTIVE | COMMUNITY

## 2025-01-16 RX ORDER — ASPIRIN 81 MG/1
81 TABLET ORAL
Refills: 0 | Status: ACTIVE | COMMUNITY

## 2025-01-21 ENCOUNTER — OUTPATIENT (OUTPATIENT)
Dept: OUTPATIENT SERVICES | Facility: HOSPITAL | Age: 60
LOS: 1 days | End: 2025-01-21

## 2025-01-21 DIAGNOSIS — R26.9 UNSPECIFIED ABNORMALITIES OF GAIT AND MOBILITY: ICD-10-CM

## 2025-01-22 DIAGNOSIS — E55.9 VITAMIN D DEFICIENCY, UNSPECIFIED: ICD-10-CM

## 2025-01-22 DIAGNOSIS — I35.1 NONRHEUMATIC AORTIC (VALVE) INSUFFICIENCY: ICD-10-CM

## 2025-01-22 DIAGNOSIS — Z23 ENCOUNTER FOR IMMUNIZATION: ICD-10-CM

## 2025-01-22 DIAGNOSIS — I63.9 CEREBRAL INFARCTION, UNSPECIFIED: ICD-10-CM

## 2025-01-22 DIAGNOSIS — E78.5 HYPERLIPIDEMIA, UNSPECIFIED: ICD-10-CM

## 2025-01-22 DIAGNOSIS — Z00.00 ENCOUNTER FOR GENERAL ADULT MEDICAL EXAMINATION WITHOUT ABNORMAL FINDINGS: ICD-10-CM

## 2025-02-11 ENCOUNTER — NON-APPOINTMENT (OUTPATIENT)
Age: 60
End: 2025-02-11

## 2025-02-18 ENCOUNTER — APPOINTMENT (OUTPATIENT)
Dept: NEUROLOGY | Facility: CLINIC | Age: 60
End: 2025-02-18
Payer: COMMERCIAL

## 2025-02-18 ENCOUNTER — OUTPATIENT (OUTPATIENT)
Dept: OUTPATIENT SERVICES | Facility: HOSPITAL | Age: 60
LOS: 1 days | End: 2025-02-18
Payer: COMMERCIAL

## 2025-02-18 VITALS
OXYGEN SATURATION: 97 % | SYSTOLIC BLOOD PRESSURE: 107 MMHG | BODY MASS INDEX: 30.7 KG/M2 | HEART RATE: 98 BPM | DIASTOLIC BLOOD PRESSURE: 66 MMHG | WEIGHT: 152 LBS

## 2025-02-18 DIAGNOSIS — Z00.00 ENCOUNTER FOR GENERAL ADULT MEDICAL EXAMINATION WITHOUT ABNORMAL FINDINGS: ICD-10-CM

## 2025-02-18 DIAGNOSIS — F03.90 UNSPECIFIED DEMENTIA W/OUT BEHAVIORAL DISTURBANCE: ICD-10-CM

## 2025-02-18 DIAGNOSIS — Q90.9 DOWN SYNDROME, UNSPECIFIED: ICD-10-CM

## 2025-02-18 DIAGNOSIS — R56.9 UNSPECIFIED CONVULSIONS: ICD-10-CM

## 2025-02-18 PROCEDURE — 99214 OFFICE O/P EST MOD 30 MIN: CPT

## 2025-02-18 PROCEDURE — 99204 OFFICE O/P NEW MOD 45 MIN: CPT

## 2025-02-20 DIAGNOSIS — R56.9 UNSPECIFIED CONVULSIONS: ICD-10-CM

## 2025-02-20 DIAGNOSIS — Q90.9 DOWN SYNDROME, UNSPECIFIED: ICD-10-CM

## 2025-04-08 ENCOUNTER — OUTPATIENT (OUTPATIENT)
Dept: OUTPATIENT SERVICES | Facility: HOSPITAL | Age: 60
LOS: 1 days | End: 2025-04-08
Payer: COMMERCIAL

## 2025-04-08 DIAGNOSIS — R73.03 PREDIABETES.: ICD-10-CM

## 2025-04-08 DIAGNOSIS — Z00.00 ENCOUNTER FOR GENERAL ADULT MEDICAL EXAMINATION WITHOUT ABNORMAL FINDINGS: ICD-10-CM

## 2025-04-08 PROCEDURE — 86765 RUBEOLA ANTIBODY: CPT

## 2025-04-08 PROCEDURE — 86735 MUMPS ANTIBODY: CPT

## 2025-04-08 PROCEDURE — 86762 RUBELLA ANTIBODY: CPT

## 2025-04-08 PROCEDURE — 86706 HEP B SURFACE ANTIBODY: CPT

## 2025-04-08 PROCEDURE — 86787 VARICELLA-ZOSTER ANTIBODY: CPT

## 2025-04-08 PROCEDURE — 80053 COMPREHEN METABOLIC PANEL: CPT

## 2025-04-08 PROCEDURE — 84443 ASSAY THYROID STIM HORMONE: CPT

## 2025-04-08 PROCEDURE — 86480 TB TEST CELL IMMUN MEASURE: CPT

## 2025-04-08 PROCEDURE — 86704 HEP B CORE ANTIBODY TOTAL: CPT

## 2025-04-08 PROCEDURE — 85027 COMPLETE CBC AUTOMATED: CPT

## 2025-04-08 PROCEDURE — 82306 VITAMIN D 25 HYDROXY: CPT

## 2025-04-08 PROCEDURE — 80061 LIPID PANEL: CPT

## 2025-04-08 PROCEDURE — 83036 HEMOGLOBIN GLYCOSYLATED A1C: CPT

## 2025-04-08 PROCEDURE — 86803 HEPATITIS C AB TEST: CPT

## 2025-04-08 PROCEDURE — 87340 HEPATITIS B SURFACE AG IA: CPT

## 2025-04-09 DIAGNOSIS — Z00.00 ENCOUNTER FOR GENERAL ADULT MEDICAL EXAMINATION WITHOUT ABNORMAL FINDINGS: ICD-10-CM

## 2025-04-21 ENCOUNTER — OUTPATIENT (OUTPATIENT)
Dept: OUTPATIENT SERVICES | Facility: HOSPITAL | Age: 60
LOS: 1 days | End: 2025-04-21
Payer: COMMERCIAL

## 2025-04-21 ENCOUNTER — APPOINTMENT (OUTPATIENT)
Dept: PEDIATRIC DEVELOPMENTAL SERVICES | Facility: CLINIC | Age: 60
End: 2025-04-21
Payer: COMMERCIAL

## 2025-04-21 VITALS
SYSTOLIC BLOOD PRESSURE: 134 MMHG | HEIGHT: 60 IN | TEMPERATURE: 97 F | DIASTOLIC BLOOD PRESSURE: 75 MMHG | OXYGEN SATURATION: 97 % | WEIGHT: 152 LBS | BODY MASS INDEX: 29.84 KG/M2 | HEART RATE: 72 BPM

## 2025-04-21 DIAGNOSIS — E55.9 VITAMIN D DEFICIENCY, UNSPECIFIED: ICD-10-CM

## 2025-04-21 DIAGNOSIS — M54.12 SPINAL STENOSIS, CERVICAL REGION: ICD-10-CM

## 2025-04-21 DIAGNOSIS — R74.01 ELEVATION OF LEVELS OF LIVER TRANSAMINASE LEVELS: ICD-10-CM

## 2025-04-21 DIAGNOSIS — E78.5 HYPERLIPIDEMIA, UNSPECIFIED: ICD-10-CM

## 2025-04-21 DIAGNOSIS — R73.03 PREDIABETES.: ICD-10-CM

## 2025-04-21 DIAGNOSIS — I35.1 NONRHEUMATIC AORTIC (VALVE) INSUFFICIENCY: ICD-10-CM

## 2025-04-21 DIAGNOSIS — Z00.00 ENCOUNTER FOR GENERAL ADULT MEDICAL EXAMINATION WITHOUT ABNORMAL FINDINGS: ICD-10-CM

## 2025-04-21 DIAGNOSIS — M48.02 SPINAL STENOSIS, CERVICAL REGION: ICD-10-CM

## 2025-04-21 PROCEDURE — 99214 OFFICE O/P EST MOD 30 MIN: CPT | Mod: GC

## 2025-04-21 PROCEDURE — 99214 OFFICE O/P EST MOD 30 MIN: CPT

## 2025-04-21 RX ORDER — ATORVASTATIN CALCIUM 40 MG/1
40 TABLET, FILM COATED ORAL
Qty: 30 | Refills: 3 | Status: ACTIVE | COMMUNITY
Start: 2025-04-21 | End: 1900-01-01

## 2025-04-22 DIAGNOSIS — E55.9 VITAMIN D DEFICIENCY, UNSPECIFIED: ICD-10-CM

## 2025-04-22 DIAGNOSIS — M48.02 SPINAL STENOSIS, CERVICAL REGION: ICD-10-CM

## 2025-04-22 DIAGNOSIS — R74.01 ELEVATION OF LEVELS OF LIVER TRANSAMINASE LEVELS: ICD-10-CM

## 2025-04-22 DIAGNOSIS — I35.1 NONRHEUMATIC AORTIC (VALVE) INSUFFICIENCY: ICD-10-CM

## 2025-04-22 DIAGNOSIS — E78.5 HYPERLIPIDEMIA, UNSPECIFIED: ICD-10-CM

## 2025-04-22 DIAGNOSIS — R73.03 PREDIABETES: ICD-10-CM

## 2025-05-27 ENCOUNTER — NON-APPOINTMENT (OUTPATIENT)
Age: 60
End: 2025-05-27

## 2025-07-11 ENCOUNTER — APPOINTMENT (OUTPATIENT)
Dept: GASTROENTEROLOGY | Facility: CLINIC | Age: 60
End: 2025-07-11
Payer: COMMERCIAL

## 2025-07-11 ENCOUNTER — OUTPATIENT (OUTPATIENT)
Dept: OUTPATIENT SERVICES | Facility: HOSPITAL | Age: 60
LOS: 1 days | End: 2025-07-11
Payer: COMMERCIAL

## 2025-07-11 VITALS
HEART RATE: 76 BPM | DIASTOLIC BLOOD PRESSURE: 80 MMHG | BODY MASS INDEX: 27.54 KG/M2 | OXYGEN SATURATION: 98 % | SYSTOLIC BLOOD PRESSURE: 114 MMHG | TEMPERATURE: 97.5 F | WEIGHT: 136.38 LBS

## 2025-07-11 DIAGNOSIS — Z00.00 ENCOUNTER FOR GENERAL ADULT MEDICAL EXAMINATION WITHOUT ABNORMAL FINDINGS: ICD-10-CM

## 2025-07-11 PROBLEM — Z12.11 SCREENING FOR COLON CANCER: Status: ACTIVE | Noted: 2023-10-25

## 2025-07-11 PROCEDURE — 99214 OFFICE O/P EST MOD 30 MIN: CPT

## 2025-07-11 PROCEDURE — 99204 OFFICE O/P NEW MOD 45 MIN: CPT

## 2025-07-11 RX ORDER — POLYETHYLENE GLYCOL 3350, SODIUM SULFATE, POTASSIUM CHLORIDE, MAGNESIUM SULFATE, AND SODIUM CHLORIDE FOR ORAL SOLUTION 178.7-7.3G
178.7 KIT ORAL
Qty: 1 | Refills: 0 | Status: ACTIVE | COMMUNITY
Start: 2025-07-11 | End: 1900-01-01

## 2025-07-14 DIAGNOSIS — Z12.11 ENCOUNTER FOR SCREENING FOR MALIGNANT NEOPLASM OF COLON: ICD-10-CM

## 2025-07-21 ENCOUNTER — OUTPATIENT (OUTPATIENT)
Dept: OUTPATIENT SERVICES | Facility: HOSPITAL | Age: 60
LOS: 1 days | End: 2025-07-21
Payer: COMMERCIAL

## 2025-07-21 ENCOUNTER — APPOINTMENT (OUTPATIENT)
Dept: PEDIATRIC DEVELOPMENTAL SERVICES | Facility: CLINIC | Age: 60
End: 2025-07-21
Payer: COMMERCIAL

## 2025-07-21 VITALS
BODY MASS INDEX: 26.7 KG/M2 | OXYGEN SATURATION: 97 % | TEMPERATURE: 98 F | SYSTOLIC BLOOD PRESSURE: 101 MMHG | HEIGHT: 60 IN | HEART RATE: 79 BPM | WEIGHT: 136 LBS | DIASTOLIC BLOOD PRESSURE: 64 MMHG

## 2025-07-21 DIAGNOSIS — R56.9 UNSPECIFIED CONVULSIONS: ICD-10-CM

## 2025-07-21 DIAGNOSIS — I63.9 CEREBRAL INFARCTION, UNSPECIFIED: ICD-10-CM

## 2025-07-21 DIAGNOSIS — I35.1 NONRHEUMATIC AORTIC (VALVE) INSUFFICIENCY: ICD-10-CM

## 2025-07-21 DIAGNOSIS — Z00.00 ENCOUNTER FOR GENERAL ADULT MEDICAL EXAMINATION WITHOUT ABNORMAL FINDINGS: ICD-10-CM

## 2025-07-21 DIAGNOSIS — Q90.9 DOWN SYNDROME, UNSPECIFIED: ICD-10-CM

## 2025-07-21 DIAGNOSIS — R73.03 PREDIABETES.: ICD-10-CM

## 2025-07-21 DIAGNOSIS — E55.9 VITAMIN D DEFICIENCY, UNSPECIFIED: ICD-10-CM

## 2025-07-21 DIAGNOSIS — E78.5 HYPERLIPIDEMIA, UNSPECIFIED: ICD-10-CM

## 2025-07-21 DIAGNOSIS — R74.01 ELEVATION OF LEVELS OF LIVER TRANSAMINASE LEVELS: ICD-10-CM

## 2025-07-21 PROCEDURE — 99214 OFFICE O/P EST MOD 30 MIN: CPT | Mod: GC

## 2025-07-21 PROCEDURE — 99214 OFFICE O/P EST MOD 30 MIN: CPT

## 2025-07-22 DIAGNOSIS — E55.9 VITAMIN D DEFICIENCY, UNSPECIFIED: ICD-10-CM

## 2025-07-22 DIAGNOSIS — R73.03 PREDIABETES: ICD-10-CM

## 2025-07-22 DIAGNOSIS — R56.9 UNSPECIFIED CONVULSIONS: ICD-10-CM

## 2025-07-22 DIAGNOSIS — I35.1 NONRHEUMATIC AORTIC (VALVE) INSUFFICIENCY: ICD-10-CM

## 2025-07-22 DIAGNOSIS — E78.5 HYPERLIPIDEMIA, UNSPECIFIED: ICD-10-CM

## 2025-07-22 DIAGNOSIS — R74.01 ELEVATION OF LEVELS OF LIVER TRANSAMINASE LEVELS: ICD-10-CM

## 2025-07-22 DIAGNOSIS — I63.9 CEREBRAL INFARCTION, UNSPECIFIED: ICD-10-CM

## 2025-08-07 ENCOUNTER — APPOINTMENT (OUTPATIENT)
Dept: NEUROSURGERY | Facility: CLINIC | Age: 60
End: 2025-08-07
Payer: COMMERCIAL

## 2025-08-07 DIAGNOSIS — M06.38 RHEUMATOID NODULE, VERTEBRAE: ICD-10-CM

## 2025-08-07 PROCEDURE — 99203 OFFICE O/P NEW LOW 30 MIN: CPT

## 2025-08-19 ENCOUNTER — OUTPATIENT (OUTPATIENT)
Dept: OUTPATIENT SERVICES | Facility: HOSPITAL | Age: 60
LOS: 1 days | End: 2025-08-19
Payer: COMMERCIAL

## 2025-08-19 ENCOUNTER — APPOINTMENT (OUTPATIENT)
Dept: NEUROLOGY | Facility: CLINIC | Age: 60
End: 2025-08-19
Payer: COMMERCIAL

## 2025-08-19 VITALS
HEART RATE: 78 BPM | WEIGHT: 164.25 LBS | BODY MASS INDEX: 32.25 KG/M2 | OXYGEN SATURATION: 100 % | SYSTOLIC BLOOD PRESSURE: 117 MMHG | HEIGHT: 60 IN | DIASTOLIC BLOOD PRESSURE: 77 MMHG

## 2025-08-19 DIAGNOSIS — Z00.00 ENCOUNTER FOR GENERAL ADULT MEDICAL EXAMINATION WITHOUT ABNORMAL FINDINGS: ICD-10-CM

## 2025-08-19 DIAGNOSIS — R56.9 UNSPECIFIED CONVULSIONS: ICD-10-CM

## 2025-08-19 PROCEDURE — G2211 COMPLEX E/M VISIT ADD ON: CPT

## 2025-08-19 PROCEDURE — 99214 OFFICE O/P EST MOD 30 MIN: CPT

## 2025-08-20 DIAGNOSIS — R56.9 UNSPECIFIED CONVULSIONS: ICD-10-CM

## 2025-08-28 ENCOUNTER — OUTPATIENT (OUTPATIENT)
Dept: OUTPATIENT SERVICES | Facility: HOSPITAL | Age: 60
LOS: 1 days | End: 2025-08-28
Payer: COMMERCIAL

## 2025-08-28 DIAGNOSIS — Z00.00 ENCOUNTER FOR GENERAL ADULT MEDICAL EXAMINATION WITHOUT ABNORMAL FINDINGS: ICD-10-CM

## 2025-08-28 PROCEDURE — 85025 COMPLETE CBC W/AUTO DIFF WBC: CPT

## 2025-08-28 PROCEDURE — 83036 HEMOGLOBIN GLYCOSYLATED A1C: CPT

## 2025-08-28 PROCEDURE — 82306 VITAMIN D 25 HYDROXY: CPT

## 2025-08-28 PROCEDURE — 80061 LIPID PANEL: CPT

## 2025-08-28 PROCEDURE — 80053 COMPREHEN METABOLIC PANEL: CPT

## 2025-08-28 PROCEDURE — 84443 ASSAY THYROID STIM HORMONE: CPT

## 2025-08-29 DIAGNOSIS — Z00.00 ENCOUNTER FOR GENERAL ADULT MEDICAL EXAMINATION WITHOUT ABNORMAL FINDINGS: ICD-10-CM

## 2025-09-16 ENCOUNTER — EMERGENCY (EMERGENCY)
Facility: HOSPITAL | Age: 60
LOS: 0 days | Discharge: HOME | End: 2025-09-16
Attending: EMERGENCY MEDICINE
Payer: COMMERCIAL

## 2025-09-16 VITALS
DIASTOLIC BLOOD PRESSURE: 67 MMHG | OXYGEN SATURATION: 97 % | HEART RATE: 95 BPM | SYSTOLIC BLOOD PRESSURE: 102 MMHG | RESPIRATION RATE: 17 BRPM | TEMPERATURE: 99 F

## 2025-09-16 DIAGNOSIS — G40.909 EPILEPSY, UNSPECIFIED, NOT INTRACTABLE, WITHOUT STATUS EPILEPTICUS: ICD-10-CM

## 2025-09-16 DIAGNOSIS — R56.9 UNSPECIFIED CONVULSIONS: ICD-10-CM

## 2025-09-16 DIAGNOSIS — F79 UNSPECIFIED INTELLECTUAL DISABILITIES: ICD-10-CM

## 2025-09-16 DIAGNOSIS — Z86.69 PERSONAL HISTORY OF OTHER DISEASES OF THE NERVOUS SYSTEM AND SENSE ORGANS: ICD-10-CM

## 2025-09-16 DIAGNOSIS — Q90.9 DOWN SYNDROME, UNSPECIFIED: ICD-10-CM

## 2025-09-16 LAB
ALBUMIN SERPL ELPH-MCNC: 4.4 G/DL — SIGNIFICANT CHANGE UP (ref 3.5–5.2)
ALP SERPL-CCNC: 111 U/L — SIGNIFICANT CHANGE UP (ref 30–115)
ALT FLD-CCNC: 48 U/L — HIGH (ref 0–41)
ANION GAP SERPL CALC-SCNC: 11 MMOL/L — SIGNIFICANT CHANGE UP (ref 7–14)
AST SERPL-CCNC: 46 U/L — HIGH (ref 0–41)
BASOPHILS # BLD AUTO: 0.08 K/UL — SIGNIFICANT CHANGE UP (ref 0–0.2)
BASOPHILS NFR BLD AUTO: 1 % — SIGNIFICANT CHANGE UP (ref 0–1)
BILIRUB SERPL-MCNC: 1.1 MG/DL — SIGNIFICANT CHANGE UP (ref 0.2–1.2)
BUN SERPL-MCNC: 15 MG/DL — SIGNIFICANT CHANGE UP (ref 10–20)
CALCIUM SERPL-MCNC: 9.2 MG/DL — SIGNIFICANT CHANGE UP (ref 8.4–10.5)
CHLORIDE SERPL-SCNC: 104 MMOL/L — SIGNIFICANT CHANGE UP (ref 98–110)
CO2 SERPL-SCNC: 26 MMOL/L — SIGNIFICANT CHANGE UP (ref 17–32)
CREAT SERPL-MCNC: 1.3 MG/DL — SIGNIFICANT CHANGE UP (ref 0.7–1.5)
EGFR: 63 ML/MIN/1.73M2 — SIGNIFICANT CHANGE UP
EGFR: 63 ML/MIN/1.73M2 — SIGNIFICANT CHANGE UP
EOSINOPHIL # BLD AUTO: 0.1 K/UL — SIGNIFICANT CHANGE UP (ref 0–0.7)
EOSINOPHIL NFR BLD AUTO: 1.3 % — SIGNIFICANT CHANGE UP (ref 0–8)
GLUCOSE SERPL-MCNC: 103 MG/DL — HIGH (ref 70–99)
HCT VFR BLD CALC: 44.9 % — SIGNIFICANT CHANGE UP (ref 42–52)
HGB BLD-MCNC: 15.6 G/DL — SIGNIFICANT CHANGE UP (ref 14–18)
IMM GRANULOCYTES NFR BLD AUTO: 0.4 % — HIGH (ref 0.1–0.3)
LACTATE SERPL-SCNC: 1.8 MMOL/L — SIGNIFICANT CHANGE UP (ref 0.7–2)
LYMPHOCYTES # BLD AUTO: 1.06 K/UL — LOW (ref 1.2–3.4)
LYMPHOCYTES # BLD AUTO: 13.3 % — LOW (ref 20.5–51.1)
MAGNESIUM SERPL-MCNC: 2.2 MG/DL — SIGNIFICANT CHANGE UP (ref 1.8–2.4)
MCHC RBC-ENTMCNC: 32.8 PG — HIGH (ref 27–31)
MCHC RBC-ENTMCNC: 34.7 G/DL — SIGNIFICANT CHANGE UP (ref 32–37)
MCV RBC AUTO: 94.5 FL — HIGH (ref 80–94)
MONOCYTES # BLD AUTO: 0.82 K/UL — HIGH (ref 0.1–0.6)
MONOCYTES NFR BLD AUTO: 10.3 % — HIGH (ref 1.7–9.3)
NEUTROPHILS # BLD AUTO: 5.89 K/UL — SIGNIFICANT CHANGE UP (ref 1.4–6.5)
NEUTROPHILS NFR BLD AUTO: 73.7 % — SIGNIFICANT CHANGE UP (ref 42.2–75.2)
NRBC BLD AUTO-RTO: 0 /100 WBCS — SIGNIFICANT CHANGE UP (ref 0–0)
PLATELET # BLD AUTO: 260 K/UL — SIGNIFICANT CHANGE UP (ref 130–400)
PMV BLD: 9.1 FL — SIGNIFICANT CHANGE UP (ref 7.4–10.4)
POTASSIUM SERPL-MCNC: 4.4 MMOL/L — SIGNIFICANT CHANGE UP (ref 3.5–5)
POTASSIUM SERPL-SCNC: 4.4 MMOL/L — SIGNIFICANT CHANGE UP (ref 3.5–5)
PROT SERPL-MCNC: 7.5 G/DL — SIGNIFICANT CHANGE UP (ref 6–8)
RBC # BLD: 4.75 M/UL — SIGNIFICANT CHANGE UP (ref 4.7–6.1)
RBC # FLD: 13.8 % — SIGNIFICANT CHANGE UP (ref 11.5–14.5)
SODIUM SERPL-SCNC: 141 MMOL/L — SIGNIFICANT CHANGE UP (ref 135–146)
WBC # BLD: 7.98 K/UL — SIGNIFICANT CHANGE UP (ref 4.8–10.8)
WBC # FLD AUTO: 7.98 K/UL — SIGNIFICANT CHANGE UP (ref 4.8–10.8)

## 2025-09-16 PROCEDURE — 80177 DRUG SCRN QUAN LEVETIRACETAM: CPT

## 2025-09-16 PROCEDURE — 70450 CT HEAD/BRAIN W/O DYE: CPT

## 2025-09-16 PROCEDURE — 99285 EMERGENCY DEPT VISIT HI MDM: CPT | Mod: 25

## 2025-09-16 PROCEDURE — 83735 ASSAY OF MAGNESIUM: CPT

## 2025-09-16 PROCEDURE — 70450 CT HEAD/BRAIN W/O DYE: CPT | Mod: 26

## 2025-09-16 PROCEDURE — 36415 COLL VENOUS BLD VENIPUNCTURE: CPT

## 2025-09-16 PROCEDURE — 96374 THER/PROPH/DIAG INJ IV PUSH: CPT

## 2025-09-16 PROCEDURE — 83605 ASSAY OF LACTIC ACID: CPT

## 2025-09-16 PROCEDURE — 99285 EMERGENCY DEPT VISIT HI MDM: CPT

## 2025-09-16 PROCEDURE — 93010 ELECTROCARDIOGRAM REPORT: CPT

## 2025-09-16 PROCEDURE — 85025 COMPLETE CBC W/AUTO DIFF WBC: CPT

## 2025-09-16 PROCEDURE — 80053 COMPREHEN METABOLIC PANEL: CPT

## 2025-09-16 PROCEDURE — 93005 ELECTROCARDIOGRAM TRACING: CPT

## 2025-09-16 PROCEDURE — 71045 X-RAY EXAM CHEST 1 VIEW: CPT | Mod: 26

## 2025-09-16 PROCEDURE — 71045 X-RAY EXAM CHEST 1 VIEW: CPT

## 2025-09-16 RX ORDER — MIDAZOLAM IN 0.9 % SOD.CHLORID 1 MG/ML
2 PLASTIC BAG, INJECTION (ML) INTRAVENOUS ONCE
Refills: 0 | Status: DISCONTINUED | OUTPATIENT
Start: 2025-09-16 | End: 2025-09-16

## 2025-09-16 RX ORDER — LEVETIRACETAM 10 MG/ML
250 INJECTION, SOLUTION INTRAVENOUS ONCE
Refills: 0 | Status: COMPLETED | OUTPATIENT
Start: 2025-09-16 | End: 2025-09-16

## 2025-09-16 RX ADMIN — Medication 2 MILLIGRAM(S): at 17:30

## 2025-09-16 RX ADMIN — LEVETIRACETAM 250 MILLIGRAM(S): 10 INJECTION, SOLUTION INTRAVENOUS at 17:55

## 2025-09-20 LAB — LEVETIRACETAM SERPL-MCNC: 5.8 UG/ML — LOW (ref 10–40)

## 2025-09-22 PROBLEM — R74.01 ELEVATED ALT MEASUREMENT: Status: RESOLVED | Noted: 2025-04-21 | Resolved: 2025-09-22
